# Patient Record
Sex: FEMALE | Race: WHITE | NOT HISPANIC OR LATINO | Employment: OTHER | URBAN - METROPOLITAN AREA
[De-identification: names, ages, dates, MRNs, and addresses within clinical notes are randomized per-mention and may not be internally consistent; named-entity substitution may affect disease eponyms.]

---

## 2017-05-10 ENCOUNTER — OFFICE VISIT (OUTPATIENT)
Dept: SURGERY | Facility: CLINIC | Age: 67
End: 2017-05-10

## 2017-05-10 VITALS
SYSTOLIC BLOOD PRESSURE: 110 MMHG | DIASTOLIC BLOOD PRESSURE: 70 MMHG | HEART RATE: 80 BPM | OXYGEN SATURATION: 96 % | HEIGHT: 70 IN | WEIGHT: 155 LBS | BODY MASS INDEX: 22.19 KG/M2

## 2017-05-10 DIAGNOSIS — K43.2 INCISIONAL HERNIA, WITHOUT OBSTRUCTION OR GANGRENE: Primary | ICD-10-CM

## 2017-05-10 PROCEDURE — 99203 OFFICE O/P NEW LOW 30 MIN: CPT | Performed by: SURGERY

## 2017-05-10 RX ORDER — ALENDRONATE SODIUM 70 MG/1
70 TABLET ORAL
COMMUNITY
End: 2017-08-15 | Stop reason: SDUPTHER

## 2017-05-10 RX ORDER — MOMETASONE FUROATE 50 UG/1
2 SPRAY, METERED NASAL DAILY PRN
COMMUNITY

## 2017-05-10 RX ORDER — ATORVASTATIN CALCIUM 20 MG/1
20 TABLET, FILM COATED ORAL NIGHTLY
COMMUNITY
End: 2019-08-27

## 2017-05-10 RX ORDER — ALBUTEROL SULFATE 90 UG/1
2 AEROSOL, METERED RESPIRATORY (INHALATION) EVERY 4 HOURS PRN
COMMUNITY
End: 2019-08-27

## 2017-05-10 RX ORDER — MONTELUKAST SODIUM 10 MG/1
10 TABLET ORAL NIGHTLY
COMMUNITY

## 2017-05-10 RX ORDER — CETIRIZINE HYDROCHLORIDE 10 MG/1
10 TABLET ORAL DAILY
COMMUNITY

## 2017-06-15 ENCOUNTER — APPOINTMENT (OUTPATIENT)
Dept: PREADMISSION TESTING | Facility: HOSPITAL | Age: 67
End: 2017-06-15

## 2017-06-15 VITALS
RESPIRATION RATE: 18 BRPM | HEART RATE: 67 BPM | HEIGHT: 70 IN | TEMPERATURE: 98.1 F | WEIGHT: 152 LBS | OXYGEN SATURATION: 99 % | SYSTOLIC BLOOD PRESSURE: 130 MMHG | BODY MASS INDEX: 21.76 KG/M2 | DIASTOLIC BLOOD PRESSURE: 94 MMHG

## 2017-06-15 LAB
ANION GAP SERPL CALCULATED.3IONS-SCNC: 12.3 MMOL/L
BUN BLD-MCNC: 8 MG/DL (ref 8–23)
BUN/CREAT SERPL: 14 (ref 7–25)
CALCIUM SPEC-SCNC: 9.7 MG/DL (ref 8.6–10.5)
CHLORIDE SERPL-SCNC: 101 MMOL/L (ref 98–107)
CO2 SERPL-SCNC: 24.7 MMOL/L (ref 22–29)
CREAT BLD-MCNC: 0.57 MG/DL (ref 0.57–1)
DEPRECATED RDW RBC AUTO: 43.7 FL (ref 37–54)
ERYTHROCYTE [DISTWIDTH] IN BLOOD BY AUTOMATED COUNT: 12.3 % (ref 11.7–13)
GFR SERPL CREATININE-BSD FRML MDRD: 106 ML/MIN/1.73
GLUCOSE BLD-MCNC: 106 MG/DL (ref 65–99)
HCT VFR BLD AUTO: 42.6 % (ref 35.6–45.5)
HGB BLD-MCNC: 14.5 G/DL (ref 11.9–15.5)
MCH RBC QN AUTO: 33 PG (ref 26.9–32)
MCHC RBC AUTO-ENTMCNC: 34 G/DL (ref 32.4–36.3)
MCV RBC AUTO: 97 FL (ref 80.5–98.2)
PLATELET # BLD AUTO: 216 10*3/MM3 (ref 140–500)
PMV BLD AUTO: 10.2 FL (ref 6–12)
POTASSIUM BLD-SCNC: 4.4 MMOL/L (ref 3.5–5.2)
RBC # BLD AUTO: 4.39 10*6/MM3 (ref 3.9–5.2)
SODIUM BLD-SCNC: 138 MMOL/L (ref 136–145)
WBC NRBC COR # BLD: 6.34 10*3/MM3 (ref 4.5–10.7)

## 2017-06-15 PROCEDURE — 93005 ELECTROCARDIOGRAM TRACING: CPT

## 2017-06-15 PROCEDURE — 80048 BASIC METABOLIC PNL TOTAL CA: CPT | Performed by: SURGERY

## 2017-06-15 PROCEDURE — 85027 COMPLETE CBC AUTOMATED: CPT | Performed by: SURGERY

## 2017-06-15 PROCEDURE — 36415 COLL VENOUS BLD VENIPUNCTURE: CPT

## 2017-06-15 NOTE — DISCHARGE INSTRUCTIONS
Take the following medications the morning of surgery with a small sip of water:   ADVAIR    ARRIVE AT 6AM        General Instructions:  • Do not eat solid food after midnight the night before surgery.  • You may drink clear liquids day of surgery but must stop at least one hour before your hospital arrival time.  • It is beneficial for you to have a clear drink that contains carbohydrates the day of surgery.  We suggest a 20 ounce bottle of Gatorade or Powerade for non-diabetic patients or a 20 ounce bottle of G2 or Powerade Zero for diabetic patients. (Pediatric patients, are not advised to drink a 20 ounce carbohydrate drink)    Clear liquids are liquids you can see through.  Nothing red in color.     Plain water                               Sports drinks  Sodas                                   Gelatin (Jell-O)  Fruit juices without pulp such as white grape juice and apple juice  Popsicles that contain no fruit or yogurt  Tea or coffee (no cream or milk added)  Gatorade / Powerade  G2 / Powerade Zero    • Infants may have breast milk up to four hours before surgery.  • Infants drinking formula may drink formula up to six hours before surgery.   • Patients who avoid smoking, chewing tobacco and alcohol for 4 weeks prior to surgery have a reduced risk of post-operative complications.  Quit smoking as many days before surgery as you can.  • Do not smoke, use chewing tobacco or drink alcohol the day of surgery.   • If applicable bring your C-PAP/ BI-PAP machine.  • Bring any papers given to you in the doctor’s office.  • Wear clean comfortable clothes and socks.  • Do not wear contact lenses or make-up.  Bring a case for your glasses.   • Bring crutches or walker if applicable.  • Leave all other valuables and jewelry at home.  • The Pre-Admission Testing nurse will instruct you to bring medications if unable to obtain an accurate list in Pre-Admission Testing.        If you were given a blood bank ID arm band  remember to bring it with you the day of surgery.    Preventing a Surgical Site Infection:  • For 2 to 3 days before surgery, avoid shaving with a razor because the razor can irritate skin and make it easier to develop an infection.  • The night prior to surgery sleep in a clean bed with clean clothing.  Do not allow pets to sleep with you.  • Shower on the morning of surgery using a fresh bar of anti-bacterial soap (such as Dial) and clean washcloth.  Dry with a clean towel and dress in clean clothing.  • Ask your surgeon if you will be receiving antibiotics prior to surgery.  • Make sure you, your family, and all healthcare providers clean their hands with soap and water or an alcohol based hand  before caring for you or your wound.    Day of surgery:  Upon arrival, a Pre-op nurse and Anesthesiologist will review your health history, obtain vital signs, and answer questions you may have.  The only belongings needed at this time will be your home medications and if applicable your C-PAP/BI-PAP machine.  If you are staying overnight your family can leave the rest of your belongings in the car and bring them to your room later.  A Pre-op nurse will start an IV and you may receive medication in preparation for surgery, including something to help you relax.  Your family will be able to see you in the Pre-op area.  While you are in surgery your family should notify the waiting room  if they leave the waiting room area and provide a contact phone number.    Please be aware that surgery does come with discomfort.  We want to make every effort to control your discomfort so please discuss any uncontrolled symptoms with your nurse.   Your doctor will most likely have prescribed pain medications.      If you are going home after surgery you will receive individualized written care instructions before being discharged.  A responsible adult must drive you to and from the hospital on the day of your surgery  and stay with you for 24 hours.    If you are staying overnight following surgery, you will be transported to your hospital room following the recovery period.  [facility] has [facility] rooms.    If you have any questions please call Pre-Admission Testing at [phone].  Deductibles and co-payments are collected on the day of service. Please be prepared to pay the required co-pay, deductible or deposit on the day of service as defined by your plan.

## 2017-06-20 ENCOUNTER — HOSPITAL ENCOUNTER (OUTPATIENT)
Facility: HOSPITAL | Age: 67
Setting detail: HOSPITAL OUTPATIENT SURGERY
Discharge: HOME | End: 2017-06-20
Attending: SURGERY | Admitting: SURGERY

## 2017-06-20 ENCOUNTER — ANESTHESIA (OUTPATIENT)
Dept: PERIOP | Facility: HOSPITAL | Age: 67
End: 2017-06-20

## 2017-06-20 ENCOUNTER — ANESTHESIA EVENT (OUTPATIENT)
Dept: PERIOP | Facility: HOSPITAL | Age: 67
End: 2017-06-20

## 2017-06-20 VITALS
HEIGHT: 70 IN | HEART RATE: 78 BPM | WEIGHT: 155.44 LBS | OXYGEN SATURATION: 94 % | TEMPERATURE: 97.9 F | BODY MASS INDEX: 22.25 KG/M2 | SYSTOLIC BLOOD PRESSURE: 126 MMHG | RESPIRATION RATE: 16 BRPM | DIASTOLIC BLOOD PRESSURE: 80 MMHG

## 2017-06-20 DIAGNOSIS — K43.2 INCISIONAL HERNIA, WITHOUT OBSTRUCTION OR GANGRENE: ICD-10-CM

## 2017-06-20 PROCEDURE — 25010000002 FENTANYL CITRATE (PF) 100 MCG/2ML SOLUTION: Performed by: NURSE ANESTHETIST, CERTIFIED REGISTERED

## 2017-06-20 PROCEDURE — 25010000002 ONDANSETRON PER 1 MG: Performed by: NURSE ANESTHETIST, CERTIFIED REGISTERED

## 2017-06-20 PROCEDURE — 25010000002 NEOSTIGMINE PER 0.5 MG: Performed by: NURSE ANESTHETIST, CERTIFIED REGISTERED

## 2017-06-20 PROCEDURE — 25010000002 HYDRALAZINE PER 20 MG: Performed by: NURSE ANESTHETIST, CERTIFIED REGISTERED

## 2017-06-20 PROCEDURE — 49655 PR LAP, INCISIONAL HERNIA REPAIR,INCARCERATED: CPT | Performed by: SURGERY

## 2017-06-20 PROCEDURE — 25010000003 CEFAZOLIN IN DEXTROSE 2-4 GM/100ML-% SOLUTION: Performed by: SURGERY

## 2017-06-20 PROCEDURE — C1781 MESH (IMPLANTABLE): HCPCS | Performed by: SURGERY

## 2017-06-20 PROCEDURE — S0260 H&P FOR SURGERY: HCPCS | Performed by: SURGERY

## 2017-06-20 PROCEDURE — 25010000002 DEXAMETHASONE PER 1 MG: Performed by: NURSE ANESTHETIST, CERTIFIED REGISTERED

## 2017-06-20 PROCEDURE — 25010000002 PROPOFOL 10 MG/ML EMULSION: Performed by: NURSE ANESTHETIST, CERTIFIED REGISTERED

## 2017-06-20 PROCEDURE — 25010000002 HYDROMORPHONE PER 4 MG: Performed by: NURSE ANESTHETIST, CERTIFIED REGISTERED

## 2017-06-20 PROCEDURE — 25010000002 MIDAZOLAM PER 1 MG: Performed by: ANESTHESIOLOGY

## 2017-06-20 PROCEDURE — 63710000001 PROMETHAZINE PER 25 MG: Performed by: NURSE ANESTHETIST, CERTIFIED REGISTERED

## 2017-06-20 DEVICE — VENTRALIGHT ST MESH WITH ECHO PS POSITONING SYSTEM
Type: IMPLANTABLE DEVICE | Status: FUNCTIONAL
Brand: VENTRALIGHT ST MESH WITH ECHO PS POSITONING SYSTEM

## 2017-06-20 RX ORDER — PROPOFOL 10 MG/ML
VIAL (ML) INTRAVENOUS AS NEEDED
Status: DISCONTINUED | OUTPATIENT
Start: 2017-06-20 | End: 2017-06-20 | Stop reason: SURG

## 2017-06-20 RX ORDER — LIDOCAINE HYDROCHLORIDE 20 MG/ML
INJECTION, SOLUTION INFILTRATION; PERINEURAL AS NEEDED
Status: DISCONTINUED | OUTPATIENT
Start: 2017-06-20 | End: 2017-06-20 | Stop reason: SURG

## 2017-06-20 RX ORDER — PROMETHAZINE HYDROCHLORIDE 12.5 MG/1
12.5 TABLET ORAL EVERY 6 HOURS PRN
Qty: 10 TABLET | Refills: 0 | Status: SHIPPED | OUTPATIENT
Start: 2017-06-20 | End: 2017-07-20

## 2017-06-20 RX ORDER — PROMETHAZINE HYDROCHLORIDE 25 MG/1
12.5 TABLET ORAL ONCE AS NEEDED
Status: DISCONTINUED | OUTPATIENT
Start: 2017-06-20 | End: 2017-06-20 | Stop reason: HOSPADM

## 2017-06-20 RX ORDER — SODIUM CHLORIDE 9 MG/ML
INJECTION, SOLUTION INTRAVENOUS AS NEEDED
Status: DISCONTINUED | OUTPATIENT
Start: 2017-06-20 | End: 2017-06-20 | Stop reason: HOSPADM

## 2017-06-20 RX ORDER — DIPHENHYDRAMINE HYDROCHLORIDE 50 MG/ML
12.5 INJECTION INTRAMUSCULAR; INTRAVENOUS
Status: DISCONTINUED | OUTPATIENT
Start: 2017-06-20 | End: 2017-06-20 | Stop reason: HOSPADM

## 2017-06-20 RX ORDER — PROMETHAZINE HYDROCHLORIDE 25 MG/1
25 TABLET ORAL ONCE AS NEEDED
Status: COMPLETED | OUTPATIENT
Start: 2017-06-20 | End: 2017-06-20

## 2017-06-20 RX ORDER — HYDROMORPHONE HYDROCHLORIDE 1 MG/ML
0.5 INJECTION, SOLUTION INTRAMUSCULAR; INTRAVENOUS; SUBCUTANEOUS
Status: DISCONTINUED | OUTPATIENT
Start: 2017-06-20 | End: 2017-06-20 | Stop reason: HOSPADM

## 2017-06-20 RX ORDER — DEXAMETHASONE SODIUM PHOSPHATE 10 MG/ML
INJECTION INTRAMUSCULAR; INTRAVENOUS AS NEEDED
Status: DISCONTINUED | OUTPATIENT
Start: 2017-06-20 | End: 2017-06-20 | Stop reason: SURG

## 2017-06-20 RX ORDER — MIDAZOLAM HYDROCHLORIDE 1 MG/ML
2 INJECTION INTRAMUSCULAR; INTRAVENOUS
Status: DISCONTINUED | OUTPATIENT
Start: 2017-06-20 | End: 2017-06-20 | Stop reason: HOSPADM

## 2017-06-20 RX ORDER — PROMETHAZINE HYDROCHLORIDE 25 MG/ML
12.5 INJECTION, SOLUTION INTRAMUSCULAR; INTRAVENOUS ONCE AS NEEDED
Status: COMPLETED | OUTPATIENT
Start: 2017-06-20 | End: 2017-06-20

## 2017-06-20 RX ORDER — LABETALOL HYDROCHLORIDE 5 MG/ML
5 INJECTION, SOLUTION INTRAVENOUS
Status: DISCONTINUED | OUTPATIENT
Start: 2017-06-20 | End: 2017-06-20 | Stop reason: HOSPADM

## 2017-06-20 RX ORDER — FENTANYL CITRATE 50 UG/ML
INJECTION, SOLUTION INTRAMUSCULAR; INTRAVENOUS AS NEEDED
Status: DISCONTINUED | OUTPATIENT
Start: 2017-06-20 | End: 2017-06-20 | Stop reason: SURG

## 2017-06-20 RX ORDER — HYDROMORPHONE HCL 110MG/55ML
PATIENT CONTROLLED ANALGESIA SYRINGE INTRAVENOUS AS NEEDED
Status: DISCONTINUED | OUTPATIENT
Start: 2017-06-20 | End: 2017-06-20 | Stop reason: SURG

## 2017-06-20 RX ORDER — SODIUM CHLORIDE, SODIUM LACTATE, POTASSIUM CHLORIDE, CALCIUM CHLORIDE 600; 310; 30; 20 MG/100ML; MG/100ML; MG/100ML; MG/100ML
9 INJECTION, SOLUTION INTRAVENOUS CONTINUOUS
Status: DISCONTINUED | OUTPATIENT
Start: 2017-06-20 | End: 2017-06-20 | Stop reason: HOSPADM

## 2017-06-20 RX ORDER — ONDANSETRON 2 MG/ML
4 INJECTION INTRAMUSCULAR; INTRAVENOUS ONCE AS NEEDED
Status: DISCONTINUED | OUTPATIENT
Start: 2017-06-20 | End: 2017-06-20 | Stop reason: HOSPADM

## 2017-06-20 RX ORDER — HYDRALAZINE HYDROCHLORIDE 20 MG/ML
INJECTION INTRAMUSCULAR; INTRAVENOUS AS NEEDED
Status: DISCONTINUED | OUTPATIENT
Start: 2017-06-20 | End: 2017-06-20 | Stop reason: SURG

## 2017-06-20 RX ORDER — GLYCOPYRROLATE 0.2 MG/ML
INJECTION INTRAMUSCULAR; INTRAVENOUS AS NEEDED
Status: DISCONTINUED | OUTPATIENT
Start: 2017-06-20 | End: 2017-06-20 | Stop reason: SURG

## 2017-06-20 RX ORDER — FAMOTIDINE 10 MG/ML
20 INJECTION, SOLUTION INTRAVENOUS ONCE
Status: COMPLETED | OUTPATIENT
Start: 2017-06-20 | End: 2017-06-20

## 2017-06-20 RX ORDER — FLUMAZENIL 0.1 MG/ML
0.2 INJECTION INTRAVENOUS AS NEEDED
Status: DISCONTINUED | OUTPATIENT
Start: 2017-06-20 | End: 2017-06-20 | Stop reason: HOSPADM

## 2017-06-20 RX ORDER — ROCURONIUM BROMIDE 10 MG/ML
INJECTION, SOLUTION INTRAVENOUS AS NEEDED
Status: DISCONTINUED | OUTPATIENT
Start: 2017-06-20 | End: 2017-06-20 | Stop reason: SURG

## 2017-06-20 RX ORDER — ONDANSETRON 2 MG/ML
INJECTION INTRAMUSCULAR; INTRAVENOUS AS NEEDED
Status: DISCONTINUED | OUTPATIENT
Start: 2017-06-20 | End: 2017-06-20 | Stop reason: SURG

## 2017-06-20 RX ORDER — MIDAZOLAM HYDROCHLORIDE 1 MG/ML
1 INJECTION INTRAMUSCULAR; INTRAVENOUS
Status: DISCONTINUED | OUTPATIENT
Start: 2017-06-20 | End: 2017-06-20 | Stop reason: HOSPADM

## 2017-06-20 RX ORDER — PROMETHAZINE HYDROCHLORIDE 25 MG/1
25 SUPPOSITORY RECTAL ONCE AS NEEDED
Status: COMPLETED | OUTPATIENT
Start: 2017-06-20 | End: 2017-06-20

## 2017-06-20 RX ORDER — FENTANYL CITRATE 50 UG/ML
50 INJECTION, SOLUTION INTRAMUSCULAR; INTRAVENOUS
Status: DISCONTINUED | OUTPATIENT
Start: 2017-06-20 | End: 2017-06-20 | Stop reason: HOSPADM

## 2017-06-20 RX ORDER — EPHEDRINE SULFATE 50 MG/ML
5 INJECTION, SOLUTION INTRAVENOUS ONCE AS NEEDED
Status: DISCONTINUED | OUTPATIENT
Start: 2017-06-20 | End: 2017-06-20 | Stop reason: HOSPADM

## 2017-06-20 RX ORDER — SODIUM CHLORIDE 0.9 % (FLUSH) 0.9 %
1-10 SYRINGE (ML) INJECTION AS NEEDED
Status: DISCONTINUED | OUTPATIENT
Start: 2017-06-20 | End: 2017-06-20 | Stop reason: HOSPADM

## 2017-06-20 RX ORDER — OXYCODONE AND ACETAMINOPHEN 7.5; 325 MG/1; MG/1
1 TABLET ORAL ONCE AS NEEDED
Status: COMPLETED | OUTPATIENT
Start: 2017-06-20 | End: 2017-06-20

## 2017-06-20 RX ORDER — BUPIVACAINE HYDROCHLORIDE AND EPINEPHRINE 2.5; 5 MG/ML; UG/ML
INJECTION, SOLUTION INFILTRATION; PERINEURAL AS NEEDED
Status: DISCONTINUED | OUTPATIENT
Start: 2017-06-20 | End: 2017-06-20 | Stop reason: HOSPADM

## 2017-06-20 RX ORDER — NALOXONE HCL 0.4 MG/ML
0.2 VIAL (ML) INJECTION AS NEEDED
Status: DISCONTINUED | OUTPATIENT
Start: 2017-06-20 | End: 2017-06-20 | Stop reason: HOSPADM

## 2017-06-20 RX ORDER — HYDRALAZINE HYDROCHLORIDE 20 MG/ML
5 INJECTION INTRAMUSCULAR; INTRAVENOUS
Status: DISCONTINUED | OUTPATIENT
Start: 2017-06-20 | End: 2017-06-20 | Stop reason: HOSPADM

## 2017-06-20 RX ORDER — CEFAZOLIN SODIUM 2 G/100ML
2 INJECTION, SOLUTION INTRAVENOUS ONCE
Status: COMPLETED | OUTPATIENT
Start: 2017-06-20 | End: 2017-06-20

## 2017-06-20 RX ORDER — HYDROCODONE BITARTRATE AND ACETAMINOPHEN 7.5; 325 MG/1; MG/1
1 TABLET ORAL ONCE AS NEEDED
Status: DISCONTINUED | OUTPATIENT
Start: 2017-06-20 | End: 2017-06-20 | Stop reason: HOSPADM

## 2017-06-20 RX ORDER — OXYCODONE HYDROCHLORIDE AND ACETAMINOPHEN 5; 325 MG/1; MG/1
1 TABLET ORAL EVERY 4 HOURS PRN
Qty: 30 TABLET | Refills: 0 | Status: SHIPPED | OUTPATIENT
Start: 2017-06-20 | End: 2017-06-30

## 2017-06-20 RX ADMIN — SODIUM CHLORIDE, POTASSIUM CHLORIDE, SODIUM LACTATE AND CALCIUM CHLORIDE 9 ML/HR: 600; 310; 30; 20 INJECTION, SOLUTION INTRAVENOUS at 06:24

## 2017-06-20 RX ADMIN — FAMOTIDINE 20 MG: 10 INJECTION, SOLUTION INTRAVENOUS at 07:42

## 2017-06-20 RX ADMIN — ONDANSETRON 4 MG: 2 INJECTION INTRAMUSCULAR; INTRAVENOUS at 10:17

## 2017-06-20 RX ADMIN — FENTANYL CITRATE 50 MCG: 50 INJECTION INTRAMUSCULAR; INTRAVENOUS at 10:50

## 2017-06-20 RX ADMIN — DEXAMETHASONE SODIUM PHOSPHATE 10 MG: 10 INJECTION INTRAMUSCULAR; INTRAVENOUS at 08:40

## 2017-06-20 RX ADMIN — HYDROMORPHONE HYDROCHLORIDE 0.5 MG: 1 INJECTION, SOLUTION INTRAMUSCULAR; INTRAVENOUS; SUBCUTANEOUS at 12:34

## 2017-06-20 RX ADMIN — PROMETHAZINE HYDROCHLORIDE 25 MG: 25 TABLET ORAL at 11:06

## 2017-06-20 RX ADMIN — MIDAZOLAM 1 MG: 1 INJECTION INTRAMUSCULAR; INTRAVENOUS at 07:42

## 2017-06-20 RX ADMIN — OXYCODONE HYDROCHLORIDE AND ACETAMINOPHEN 1 TABLET: 7.5; 325 TABLET ORAL at 11:20

## 2017-06-20 RX ADMIN — FENTANYL CITRATE 50 MCG: 50 INJECTION INTRAMUSCULAR; INTRAVENOUS at 09:03

## 2017-06-20 RX ADMIN — FENTANYL CITRATE 50 MCG: 50 INJECTION INTRAMUSCULAR; INTRAVENOUS at 08:57

## 2017-06-20 RX ADMIN — HYDROMORPHONE HYDROCHLORIDE 0.5 MG: 2 INJECTION, SOLUTION INTRAMUSCULAR; INTRAVENOUS; SUBCUTANEOUS at 10:31

## 2017-06-20 RX ADMIN — HYDROMORPHONE HYDROCHLORIDE 0.5 MG: 2 INJECTION, SOLUTION INTRAMUSCULAR; INTRAVENOUS; SUBCUTANEOUS at 08:57

## 2017-06-20 RX ADMIN — CEFAZOLIN SODIUM 2 G: 2 INJECTION, SOLUTION INTRAVENOUS at 08:40

## 2017-06-20 RX ADMIN — FENTANYL CITRATE 100 MCG: 50 INJECTION INTRAMUSCULAR; INTRAVENOUS at 08:29

## 2017-06-20 RX ADMIN — NEOSTIGMINE METHYLSULFATE 3 MG: 1 INJECTION INTRAMUSCULAR; INTRAVENOUS; SUBCUTANEOUS at 10:19

## 2017-06-20 RX ADMIN — SODIUM CHLORIDE, POTASSIUM CHLORIDE, SODIUM LACTATE AND CALCIUM CHLORIDE: 600; 310; 30; 20 INJECTION, SOLUTION INTRAVENOUS at 09:16

## 2017-06-20 RX ADMIN — LIDOCAINE HYDROCHLORIDE 60 MG: 20 INJECTION, SOLUTION INFILTRATION; PERINEURAL at 08:32

## 2017-06-20 RX ADMIN — PROPOFOL 200 MG: 10 INJECTION, EMULSION INTRAVENOUS at 08:32

## 2017-06-20 RX ADMIN — GLYCOPYRROLATE 0.4 MG: 0.2 INJECTION INTRAMUSCULAR; INTRAVENOUS at 10:19

## 2017-06-20 RX ADMIN — HYDRALAZINE HYDROCHLORIDE 5 MG: 20 INJECTION INTRAMUSCULAR; INTRAVENOUS at 09:03

## 2017-06-20 RX ADMIN — ROCURONIUM BROMIDE 50 MG: 10 INJECTION INTRAVENOUS at 08:32

## 2017-06-20 RX ADMIN — FENTANYL CITRATE 50 MCG: 50 INJECTION INTRAMUSCULAR; INTRAVENOUS at 11:10

## 2017-06-20 NOTE — H&P
H&P      Chief complaint: Abdominal pain      Patient is a 67 y.o. female referred by Brian Damian MD for evaluation of abdominal pain. Patient had undergone a open colon resection 2 years ago for a perforation after a colonoscopy. Patient had been doing well until approximately 3-4 months ago she noticed a bulge off to the left of the incision which intermittently was hard and painful. Patient describes the pain isn't achy cramp that did not radiate. Patient denies fever, chills, nausea or vomiting. Patient had never had this in the past. Patient reports activities make it worse and rest makes it better.           Past Medical History:   Diagnosis Date   • Abdominal hernia     • Asthma     • Thyroid nodule              Past Surgical History:   Procedure Laterality Date   • COLONOSCOPY       • TUBAL ABDOMINAL LIGATION                Family History   Problem Relation Age of Onset   • Breast cancer Mother     • Colon cancer Maternal Grandmother     • Breast cancer Maternal Aunt     • Pancreatic cancer Father     • Lymphoma Sister                Social History    Substance Use Topics    • Smoking status: Never Smoker    • Smokeless tobacco: Never Used    • Alcohol use Yes         Comment: weekly             Current Outpatient Prescriptions:   • albuterol (PROVENTIL HFA;VENTOLIN HFA) 108 (90 BASE) MCG/ACT inhaler, Inhale 2 puffs Every 4 (Four) Hours As Needed for Wheezing., Disp: , Rfl:   • alendronate (FOSAMAX) 70 MG tablet, Take 70 mg by mouth Every 7 (Seven) Days., Disp: , Rfl:   • atorvastatin (LIPITOR) 20 MG tablet, Take 20 mg by mouth Daily., Disp: , Rfl:   • cetirizine (zyrTEC) 10 MG tablet, Take 10 mg by mouth Daily., Disp: , Rfl:   • fluticasone-salmeterol (ADVAIR) 100-50 MCG/DOSE DISKUS, Inhale 2 (Two) Times a Day., Disp: , Rfl:   • mometasone (NASONEX) 50 MCG/ACT nasal spray, 2 sprays into each nostril Daily., Disp: , Rfl:   • montelukast (SINGULAIR) 10 MG tablet, Take 10 mg by mouth Every Night., Disp:  , Rfl:      Review of Systems   Respiratory: Positive for cough and shortness of breath.   Gastrointestinal: Positive for abdominal distention and abdominal pain.   Allergic/Immunologic: Positive for environmental allergies.   All other systems reviewed and are negative.                                      Physical Exam  General/physcological: Alert and oriented x3 in no acute distress  HEENT: Normal cephalic, atraumatic, PERRLA, EOMI, sclera anicteric, moist mucous membranes, neck is supple, no JVD, no carotid bruits, no thyromegaly no adenopathy  Respiratory: CTA and percussion  CVA: RRR, normal S1-S2, no murmurs, no gallops or rubs  GI: Positive BS, soft, nondistended, nontender, no rebound, no guarding, midline incisional hernia reducible, no organomegaly and no masses  Musculoskeletal: Full range of motion, no clubbing, no cyanosis or edema  Neurovascular: Grossly intact      Patient does not use tobacco products currently and I have counseled the patient to not start using tobacco products in the future.     Assessment:  Symptomatic incisional hernia  Plan:  I have recommended that the patient undergo a laparoscopic incisional hernia repair with mesh using the da Sun robot. I have discussed this procedure in detail with the patient. I have discussed the risks, benefits and alternatives. I have discussed the risk of anesthesia, infection, bleeding, bowel injuries and recurrence. Patient understands these risk, benefits and alternatives and wishes to proceed. I have her scheduled at her earliest convenience.     Nayeli Goldstein MD  General, Minimally Invasive and Endoscopic Surgery  [facility]     [address], [address]  [place], [place], [address]  P: [phone]  F: [phone]     Cc: Brian Damian MD

## 2017-06-20 NOTE — DISCHARGE INSTRUCTIONS
You had one Percocet for pain at 11:20 am.    You had one Phenergan for nausea at 11:05 am.          ABDOMINAL HERNIA REPAIR  POST OP RECOMMENDATIONS  Dr. Goldstein  [phone]    ACTIVITIES:  1. Expect to rest the day of surgery, but get up several times daily to reduce the risk of getting a clot in your legs.  2. No strenuous activity or lifting over 10 lbs. for until 6 weeks out from surgery.  Try to avoid squatting and deep bends for about a week.  3. Do not drive while on pain medicine.  You must be off pain meds 24 hours before driving.  4. You can climb stairs, but minimize this and initially do one step at a time (both feet on one step rather than going up with each step.)  5. If an abdominal binder is recommended, wear only when not recumbent.     SYMPTOMS:  1. Skin blistering is not unusual at the incision due to the thinness of the skin from the chronic herniation.  If this is detected at the post-operative appointment it may simply be treated with a topical antibiotic.  2. Constipation is common when taking pain medication for surgery.  Over the counter laxatives, such as Miralax and Milk of Magnesium, can be used temporarily.   3. Fatigue and decreased stamina is not unusual for about a week or so after surgery due to anesthesia.  Try to take walks and some mild activity between resting.  4. If your procedure was performed laparoscopically as opposed to open, shoulder pain is not unusual from the “gas” used in laparoscopy which will dissipate within 1-3 days.  If it does not, call your physician.    WOUND SITE:  1. Dressings can be removed 2 days after procedure.The “tega-derm” may be removed in 2 days if instructed to.  2. Dressings may occasionally have spots of blood on them.  As long as it is dry, these do not need to be changed.  If it is soaked, then the dressing should be removed and a new dressing placed.  3. Skin irritation, redness or itching can prompt removal of the bandage earlier if  present.  4. Redness or warmth that extends outside of the bandage or is spreading should prompt a call to physician.  5. Steri-strips are to be left in place until they fall off on their own in 1-2 weeks.  If they are irritating then they may be removed sooner.  6. Showering may occur while the “tega-derm” is in place if you do not have a drain.  If it is off, then you must wait 2 days after surgery before showering.  7. If you have a drain, no showering until removed, only sponge bathe.  Empty the drain daily and record output.  Keep drain entry site covered.    MEALS:  1. Eat and Drink very lightly when returning home following surgery.  Jell-O, ginger ale, chicken noodle soup and crackers are good examples.  The day after surgery you may broaden your diet.  2. Do NOT take pain pills on an empty stomach.    WORK:  1. In general if you have a sedentary job, you can return to work in 7-10 days at the earliest.  If heavy lifting is required it may be 6 weeks or more.   Any changes to these numbers will be discussed post-operatively.  2. Use discretion and remember that your stamina will be decreased post operatively.  3. Return to work notes can be provided at the time of your post-operative appointment.    FOLLOW UP:  Call and make a post-operative appointment for approximately 2 weeks after the procedure.      Nayeli Goldstein MD  General, Minimally Invasive and Endoscopic Surgery  [facility]    [address], [address]  [place], [place], [address]  P: [phone]  F: [phone]    Cc:  Brian Damian MD

## 2017-06-20 NOTE — OP NOTE
Operative Note:    Pre-op Dx:  Ventral Hernia     Post-op Dx: Incarcerated with small bowel and omentum Ventral Hernia    Procedure:  Laparoscopic Da Sun  Incarcerated Ventral Hernia Repair with Mesh    Surgeon:  Nayeli Goldstein M.D.    Assistant: SAURAV Rivers    Anesthesia:  GET    EBL:  Minium    Specimen:  None    Complications:  None    Findings:  Incarcerated incisional hernia with small bowel and omentum    Indications:  This is a pleasant 67 year old female that presented with a symptomatic Ventral hernia    Procedure:     After general endotracheal anesthesia, patient was prepped and draped in the usual sterile fashion.  Skin incision was performed midway between the left ASIS and the left  subcostal margin.  The Veress needle was inserted and the abdomen was insufflated to 15 mmHg.  The needle was removed and an Optiview 12 mm trocar with a 0° scope was placed under direct visualization.  A robotic 8 mm trocar was placed in the left upper quadrant and left lower quadrant.  Four, 0  V-lock sutures were placed intra-abdominally as well as the Bard echo mesh.  Robotic arms were docked and instruments were placed under direct visualization.  The remainder of the procedure was carried out from the robotic console.  The hernia defect closure was carried out with running  #0 V- lock sutures.  Once the hernia defect was closed, the defect was covered with Bard mesh and secured in a circumferential manner with number 0 V-lock sutures.  All needles and the balloon were removed.  There was no evidence of any bleeding.  All trochars were then removed, infiltrated with quarter percent marcaine with epinephrine and closed with 4-0 Vicryl. Sterile bandages were applied. Patient was transferred to recovery room in stable condition.      Nayeli Goldstein MD  General, Minimally Invasive and Endoscopic Surgery  [facility]    [address] [place] [address], [address]  [place], [place], [address]  P: [phone]  F:  [phone]    Cc:  Brian Damian MD

## 2017-06-20 NOTE — ANESTHESIA PREPROCEDURE EVALUATION
Anesthesia Evaluation     NPO Solid Status: > 8 hours  NPO Liquid Status: > 4 hours     Airway   Mallampati: II  no difficulty expected  Dental      Comment: crowns    Pulmonary     breath sounds clear to auscultation  (+) asthma,   Cardiovascular     Rhythm: regular        Neuro/Psych  GI/Hepatic/Renal/Endo      Musculoskeletal     Abdominal    Substance History      OB/GYN          Other                                        Anesthesia Plan    ASA 2     general     intravenous induction   Anesthetic plan and risks discussed with patient.

## 2017-06-20 NOTE — PLAN OF CARE
Problem: Perioperative Period (Adult)  Goal: Signs and Symptoms of Listed Potential Problems Will be Absent or Manageable (Perioperative Period)  Outcome: Outcome(s) achieved Date Met:  06/20/17 06/20/17 1310   Perioperative Period   Problems Assessed (Perioperative Period) all   Problems Present (Perioperative Period) none

## 2017-06-20 NOTE — ANESTHESIA PROCEDURE NOTES
Airway  Urgency: elective    Date/Time: 6/20/2017 8:38 AM  Airway not difficult    General Information and Staff    Patient location during procedure: OR  Anesthesiologist: CAMERON POPE  CRNA: GEO WASHINGTON    Indications and Patient Condition  Indications for airway management: airway protection    Preoxygenated: yes  Mask difficulty assessment: 2 - vent by mask + OA or adjuvant +/- NMBA    Final Airway Details  Final airway type: endotracheal airway      Successful airway: ETT  Cuffed: yes   Successful intubation technique: direct laryngoscopy  Facilitating devices/methods: intubating stylet  Endotracheal tube insertion site: oral  Blade: Starr  Blade size: #2  ETT size: 7.0 mm  Cormack-Lehane Classification: grade I - full view of glottis  Placement verified by: chest auscultation and capnometry   Measured from: teeth  ETT to teeth (cm): 19  Number of attempts at approach: 1    Additional Comments  Atraumatic. No dental damage noted.

## 2017-06-20 NOTE — PLAN OF CARE
"Problem: Patient Care Overview (Adult)  Goal: Plan of Care Review  Outcome: Ongoing (interventions implemented as appropriate)    06/20/17 0617   Coping/Psychosocial Response Interventions   Plan Of Care Reviewed With patient   Patient Care Overview   Progress no change       Goal: Adult Individualization and Mutuality  Outcome: Ongoing (interventions implemented as appropriate)    06/20/17 0617   Individualization   Patient Specific Goals \"FIX THE HERNIA\"       Goal: Discharge Needs Assessment  Outcome: Ongoing (interventions implemented as appropriate)    Problem: Perioperative Period (Adult)  Goal: Signs and Symptoms of Listed Potential Problems Will be Absent or Manageable (Perioperative Period)  Outcome: Ongoing (interventions implemented as appropriate)    06/20/17 0617   Perioperative Period   Problems Assessed (Perioperative Period) pain;hypoxia/hypoxemia   Problems Present (Perioperative Period) none           "

## 2017-06-20 NOTE — ANESTHESIA POSTPROCEDURE EVALUATION
Patient: April Feliz    Procedure Summary     Date Anesthesia Start Anesthesia Stop Room / Location    06/20/17 0827 1039  [facility] /  [facility]       Procedure Diagnosis Surgeon Provider    VENTRAL HERNIA REPAIR LAPAROSCOPIC WITH DAVINCI ROBOT (N/A Abdomen) Incisional hernia, without obstruction or gangrene  (Incisional hernia, without obstruction or gangrene [K43.2]) MD Akira Mendoza MD          Anesthesia Type: general  Last vitals  /95 (06/20/17 1130)    Temp 36.6 °C (97.9 °F) (06/20/17 1130)    Pulse 72 (06/20/17 1130)   Resp 16 (06/20/17 1130)    SpO2 95 % (06/20/17 1115)      Post Anesthesia Care and Evaluation    Patient location during evaluation: PACU  Patient participation: complete - patient participated  Level of consciousness: sleepy but conscious  Pain score: 0  Pain management: adequate  Airway patency: patent  Anesthetic complications: No anesthetic complications    Cardiovascular status: acceptable  Respiratory status: acceptable  Hydration status: acceptable

## 2017-06-29 ENCOUNTER — OFFICE VISIT (OUTPATIENT)
Dept: SURGERY | Facility: CLINIC | Age: 67
End: 2017-06-29

## 2017-06-29 VITALS
HEART RATE: 81 BPM | DIASTOLIC BLOOD PRESSURE: 78 MMHG | SYSTOLIC BLOOD PRESSURE: 120 MMHG | OXYGEN SATURATION: 98 % | WEIGHT: 153.6 LBS | HEIGHT: 70 IN | BODY MASS INDEX: 21.99 KG/M2

## 2017-06-29 DIAGNOSIS — Z09 FOLLOW UP: Primary | ICD-10-CM

## 2017-06-29 PROCEDURE — 99024 POSTOP FOLLOW-UP VISIT: CPT | Performed by: SURGERY

## 2017-06-29 NOTE — PROGRESS NOTES
Chief complaint:  Post-op  Follow up    History of Present Illness    This is April Whitfieldor 67 y.o. status post laparoscopic incisional hernia repair with mesh and is doing very well.  Patient denies fever, chills, nausea or vomiting.  Patient's pain is well-controlled.      The following portions of the patient's history were reviewed and updated as appropriate: allergies, current medications, past family history, past medical history, past social history, past surgical history and problem list.    Physical Exam  Incision is well-healed without evidence of infection or herniation.    Patient does not use tobacco products currently and I have counseled the patient not to start using tobacco products in the future.    Assessment/plan:    This is April Whitfieldor 67 y.o. status post laparoscopic incisional hernia repair with mesh and is doing very well.  I have instructed the patient not lift greater than 10 pounds for total of 6 weeks from the time of surgery. I have instructed the patient follow-up as needed.    Nayeli Goldstein MD  General, Minimally Invasive and Endoscopic Surgery  [facility]    [address], [address]  [place], [place], [address]  P: [phone]  F: [phone]    Cc:  Brian Damian MD

## 2017-08-15 ENCOUNTER — OFFICE VISIT (OUTPATIENT)
Dept: OBSTETRICS AND GYNECOLOGY | Facility: CLINIC | Age: 67
End: 2017-08-15

## 2017-08-15 VITALS
DIASTOLIC BLOOD PRESSURE: 98 MMHG | BODY MASS INDEX: 22.33 KG/M2 | HEIGHT: 70 IN | HEART RATE: 73 BPM | SYSTOLIC BLOOD PRESSURE: 151 MMHG | WEIGHT: 156 LBS

## 2017-08-15 DIAGNOSIS — M85.80 OSTEOPENIA: ICD-10-CM

## 2017-08-15 DIAGNOSIS — Z01.419 ENCOUNTER FOR GYNECOLOGICAL EXAMINATION WITHOUT ABNORMAL FINDING: Primary | ICD-10-CM

## 2017-08-15 PROCEDURE — 99397 PER PM REEVAL EST PAT 65+ YR: CPT | Performed by: OBSTETRICS & GYNECOLOGY

## 2017-08-15 RX ORDER — ASPIRIN 81 MG/1
81 TABLET ORAL DAILY
COMMUNITY
End: 2018-08-21 | Stop reason: SDUPTHER

## 2017-08-15 RX ORDER — ALENDRONATE SODIUM 70 MG/1
70 TABLET ORAL
Qty: 12 TABLET | Refills: 3 | Status: SHIPPED | OUTPATIENT
Start: 2017-08-15 | End: 2018-08-21 | Stop reason: SDUPTHER

## 2017-08-15 NOTE — PROGRESS NOTES
GYN Annual Exam     CC- Here for annual exam.     April Feliz is a 67 y.o. female who presents for annual well woman exam. Periods are absent due to Menopause.      OB History      Para Term  AB TAB SAB Ectopic Multiple Living    2 2 2       2          Current contraception: post menopausal status  History of abnormal Pap smear: yes - Cryo in the past  Family history of uterine, colon or ovarian cancer: yes - colon cancer   History of abnormal mammogram: yes - getting spot compression and ultrasound this week   Family history of breast cancer: yes - Mother, maternal grandmother   Last Pap : 2016  Last Mammo: 2017  Last Dexa: 2017  Last Colonoscopy:      Past Medical History:   Diagnosis Date   • Abdominal hernia    • Asthma    • Hyperlipidemia    • Thyroid nodule        Past Surgical History:   Procedure Laterality Date   • COLON RESECTION     • COLONOSCOPY     • TUBAL ABDOMINAL LIGATION     • VENTRAL HERNIA REPAIR N/A 2017    Procedure: VENTRAL HERNIA REPAIR LAPAROSCOPIC WITH DAVINCI ROBOT;  Surgeon: Nayeli Goldstein MD;  Location: [facility]  Service:          Current Outpatient Prescriptions:   •  aspirin 81 MG EC tablet, Take 81 mg by mouth Daily., Disp: , Rfl:   •  albuterol (PROVENTIL HFA;VENTOLIN HFA) 108 (90 BASE) MCG/ACT inhaler, Inhale 2 puffs Every 4 (Four) Hours As Needed for Wheezing., Disp: , Rfl:   •  alendronate (FOSAMAX) 70 MG tablet, Take 70 mg by mouth Every 7 (Seven) Days. , Disp: , Rfl:   •  atorvastatin (LIPITOR) 20 MG tablet, Take 20 mg by mouth Every Night., Disp: , Rfl:   •  Calcium Citrate-Vitamin D (CALCIUM + D PO), Take 1 tablet by mouth Daily., Disp: , Rfl:   •  cetirizine (zyrTEC) 10 MG tablet, Take 10 mg by mouth Daily., Disp: , Rfl:   •  fluticasone-salmeterol (ADVAIR) 100-50 MCG/DOSE DISKUS, Inhale 1 puff 2 (Two) Times a Day., Disp: , Rfl:   •  mometasone (NASONEX) 50 MCG/ACT nasal spray, 2 sprays into each nostril As  "Needed., Disp: , Rfl:   •  montelukast (SINGULAIR) 10 MG tablet, Take 10 mg by mouth Every Night., Disp: , Rfl:   •  Multiple Vitamins-Minerals (MULTIVITAMIN ADULT PO), Take 1 tablet by mouth Daily., Disp: , Rfl:     Allergies   Allergen Reactions   • Penicillins Rash       Social History   Substance Use Topics   • Smoking status: Former Smoker     Packs/day: 0.50     Years: 10.00     Types: Cigarettes   • Smokeless tobacco: Never Used      Comment: 33YRS AGO   • Alcohol use Yes      Comment: weekly       Family History   Problem Relation Age of Onset   • Breast cancer Mother    • Colon cancer Maternal Grandmother    • Breast cancer Maternal Aunt    • Pancreatic cancer Father    • Lymphoma Sister    • Malig Hyperthermia Neg Hx    • Ovarian cancer Neg Hx    • Uterine cancer Neg Hx    • Deep vein thrombosis Neg Hx    • Pulmonary embolism Neg Hx        Review of Systems   Constitutional: Negative for chills and fever.   Gastrointestinal: Negative for abdominal pain.   Genitourinary: Negative for dysuria, pelvic pain, vaginal bleeding and vaginal discharge.   All other systems reviewed and are negative.      /98  Pulse 73  Ht 70\" (177.8 cm)  Wt 156 lb (70.8 kg)  Breastfeeding? No  BMI 22.38 kg/m2    Physical Exam   Constitutional: She is oriented to person, place, and time. She appears well-developed and well-nourished. No distress.   HENT:   Head: Normocephalic and atraumatic.   Eyes: Conjunctivae are normal.   Neck: Normal range of motion. Neck supple. No thyromegaly present.   Cardiovascular: Normal rate and regular rhythm.    No murmur heard.  Pulmonary/Chest: Effort normal and breath sounds normal. Right breast exhibits no inverted nipple, no mass and no nipple discharge. Left breast exhibits no inverted nipple, no mass and no nipple discharge.   Abdominal: Soft. Bowel sounds are normal. She exhibits no distension. There is no tenderness.   Genitourinary: Vagina normal and uterus normal. Pelvic exam was " performed with patient supine. There is no lesion on the right labia. There is no lesion on the left labia. Uterus is not enlarged, not fixed and not tender. Cervix exhibits no motion tenderness. Right adnexum displays no mass and no tenderness. Left adnexum displays no mass and no tenderness. No bleeding (atrophic ) in the vagina. No vaginal discharge found.   Musculoskeletal: She exhibits no edema.   Lymphadenopathy:        Right: No inguinal adenopathy present.        Left: No inguinal adenopathy present.   Neurological: She is alert and oriented to person, place, and time.   Skin: No rash noted.   Psychiatric: She has a normal mood and affect. Her behavior is normal.          Assessment     1) GYN annual well woman exam.   2) osteopenia   Awaiting Dexa  Continue fosamax      Plan     1) Breast Health - Clinical breast exam & mammogram yearly, Self breast awareness monthly  2) Pap - up to date  3) Smoking status- non-smoker   4) Colon health - screening colonoscopy recommended if not up to date  5) Bone health - Weight bearing exercise, dietary calcium recommendations and vitamin D reviewed.   6) Seat belts recommended  7) Follow up prn and one year      Jerel Lincoln MD   8/15/2017  9:21 AM

## 2017-10-27 ENCOUNTER — TRANSCRIBE ORDERS (OUTPATIENT)
Dept: ADMINISTRATIVE | Facility: HOSPITAL | Age: 67
End: 2017-10-27

## 2017-10-27 DIAGNOSIS — J98.4 RESTRICTIVE LUNG DISEASE: Primary | ICD-10-CM

## 2017-11-02 ENCOUNTER — ANESTHESIA (OUTPATIENT)
Dept: GASTROENTEROLOGY | Facility: HOSPITAL | Age: 67
End: 2017-11-02

## 2017-11-02 ENCOUNTER — ANESTHESIA EVENT (OUTPATIENT)
Dept: GASTROENTEROLOGY | Facility: HOSPITAL | Age: 67
End: 2017-11-02

## 2017-11-02 ENCOUNTER — HOSPITAL ENCOUNTER (OUTPATIENT)
Facility: HOSPITAL | Age: 67
Setting detail: HOSPITAL OUTPATIENT SURGERY
Discharge: HOME | End: 2017-11-02
Attending: INTERNAL MEDICINE | Admitting: INTERNAL MEDICINE

## 2017-11-02 VITALS
RESPIRATION RATE: 20 BRPM | OXYGEN SATURATION: 96 % | TEMPERATURE: 98.9 F | DIASTOLIC BLOOD PRESSURE: 91 MMHG | WEIGHT: 156.31 LBS | BODY MASS INDEX: 22.38 KG/M2 | HEIGHT: 70 IN | HEART RATE: 68 BPM | SYSTOLIC BLOOD PRESSURE: 134 MMHG

## 2017-11-02 DIAGNOSIS — R05.9 COUGH: ICD-10-CM

## 2017-11-02 LAB
APPEARANCE FLD: CLEAR
B PERT DNA SPEC QL NAA+PROBE: NOT DETECTED
C PNEUM DNA NPH QL NAA+NON-PROBE: NOT DETECTED
COLOR FLD: COLORLESS
FLUAV H1 2009 PAND RNA NPH QL NAA+PROBE: NOT DETECTED
FLUAV H1 HA GENE NPH QL NAA+PROBE: NOT DETECTED
FLUAV H3 RNA NPH QL NAA+PROBE: NOT DETECTED
FLUAV SUBTYP SPEC NAA+PROBE: NOT DETECTED
FLUBV RNA ISLT QL NAA+PROBE: NOT DETECTED
GIE STN SPEC: NORMAL
GIE STN SPEC: NORMAL
HADV DNA SPEC NAA+PROBE: NOT DETECTED
HCOV 229E RNA SPEC QL NAA+PROBE: NOT DETECTED
HCOV HKU1 RNA SPEC QL NAA+PROBE: NOT DETECTED
HCOV NL63 RNA SPEC QL NAA+PROBE: NOT DETECTED
HCOV OC43 RNA SPEC QL NAA+PROBE: NOT DETECTED
HMPV RNA NPH QL NAA+NON-PROBE: NOT DETECTED
HPIV1 RNA SPEC QL NAA+PROBE: NOT DETECTED
HPIV2 RNA SPEC QL NAA+PROBE: NOT DETECTED
HPIV3 RNA NPH QL NAA+PROBE: NOT DETECTED
HPIV4 P GENE NPH QL NAA+PROBE: NOT DETECTED
LYMPHOCYTES NFR FLD MANUAL: 37 %
M PNEUMO IGG SER IA-ACNC: NOT DETECTED
METHOD: NORMAL
MONOCYTES NFR FLD: 1 %
MONOS+MACROS NFR FLD: 29 %
NEUTROPHILS NFR FLD MANUAL: 33 %
NUC CELL # FLD: 23 /MM3
OTHER CELLS FLUID PER 100/WBCS: 27 /100 WBCS
RBC # FLD AUTO: 10 /MM3
RHINOVIRUS RNA SPEC NAA+PROBE: NOT DETECTED
RSV RNA NPH QL NAA+NON-PROBE: NOT DETECTED

## 2017-11-02 PROCEDURE — 89051 BODY FLUID CELL COUNT: CPT | Performed by: INTERNAL MEDICINE

## 2017-11-02 PROCEDURE — 87486 CHLMYD PNEUM DNA AMP PROBE: CPT | Performed by: INTERNAL MEDICINE

## 2017-11-02 PROCEDURE — 87798 DETECT AGENT NOS DNA AMP: CPT | Performed by: INTERNAL MEDICINE

## 2017-11-02 PROCEDURE — 87206 SMEAR FLUORESCENT/ACID STAI: CPT | Performed by: INTERNAL MEDICINE

## 2017-11-02 PROCEDURE — 87633 RESP VIRUS 12-25 TARGETS: CPT | Performed by: INTERNAL MEDICINE

## 2017-11-02 PROCEDURE — 87071 CULTURE AEROBIC QUANT OTHER: CPT | Performed by: INTERNAL MEDICINE

## 2017-11-02 PROCEDURE — 87581 M.PNEUMON DNA AMP PROBE: CPT | Performed by: INTERNAL MEDICINE

## 2017-11-02 PROCEDURE — 88112 CYTOPATH CELL ENHANCE TECH: CPT | Performed by: INTERNAL MEDICINE

## 2017-11-02 PROCEDURE — 87070 CULTURE OTHR SPECIMN AEROBIC: CPT | Performed by: INTERNAL MEDICINE

## 2017-11-02 PROCEDURE — 87116 MYCOBACTERIA CULTURE: CPT | Performed by: INTERNAL MEDICINE

## 2017-11-02 PROCEDURE — 88312 SPECIAL STAINS GROUP 1: CPT | Performed by: INTERNAL MEDICINE

## 2017-11-02 PROCEDURE — 87102 FUNGUS ISOLATION CULTURE: CPT | Performed by: INTERNAL MEDICINE

## 2017-11-02 PROCEDURE — 87205 SMEAR GRAM STAIN: CPT | Performed by: INTERNAL MEDICINE

## 2017-11-02 PROCEDURE — 25010000002 PROPOFOL 10 MG/ML EMULSION: Performed by: ANESTHESIOLOGY

## 2017-11-02 RX ORDER — LIDOCAINE HYDROCHLORIDE 20 MG/ML
INJECTION, SOLUTION INFILTRATION; PERINEURAL AS NEEDED
Status: DISCONTINUED | OUTPATIENT
Start: 2017-11-02 | End: 2017-11-02 | Stop reason: SURG

## 2017-11-02 RX ORDER — LIDOCAINE HYDROCHLORIDE 10 MG/ML
INJECTION, SOLUTION EPIDURAL; INFILTRATION; INTRACAUDAL; PERINEURAL AS NEEDED
Status: DISCONTINUED | OUTPATIENT
Start: 2017-11-02 | End: 2017-11-02 | Stop reason: HOSPADM

## 2017-11-02 RX ORDER — PROPOFOL 10 MG/ML
VIAL (ML) INTRAVENOUS CONTINUOUS PRN
Status: DISCONTINUED | OUTPATIENT
Start: 2017-11-02 | End: 2017-11-02 | Stop reason: SURG

## 2017-11-02 RX ORDER — PROPOFOL 10 MG/ML
VIAL (ML) INTRAVENOUS AS NEEDED
Status: DISCONTINUED | OUTPATIENT
Start: 2017-11-02 | End: 2017-11-02 | Stop reason: SURG

## 2017-11-02 RX ORDER — SODIUM CHLORIDE 0.9 % (FLUSH) 0.9 %
1-10 SYRINGE (ML) INJECTION AS NEEDED
Status: DISCONTINUED | OUTPATIENT
Start: 2017-11-02 | End: 2017-11-02 | Stop reason: HOSPADM

## 2017-11-02 RX ORDER — SODIUM CHLORIDE, SODIUM LACTATE, POTASSIUM CHLORIDE, CALCIUM CHLORIDE 600; 310; 30; 20 MG/100ML; MG/100ML; MG/100ML; MG/100ML
30 INJECTION, SOLUTION INTRAVENOUS CONTINUOUS
Status: DISCONTINUED | OUTPATIENT
Start: 2017-11-02 | End: 2017-11-02 | Stop reason: HOSPADM

## 2017-11-02 RX ADMIN — PROPOFOL 100 MCG/KG/MIN: 10 INJECTION, EMULSION INTRAVENOUS at 08:15

## 2017-11-02 RX ADMIN — PROPOFOL 50 MG: 10 INJECTION, EMULSION INTRAVENOUS at 08:25

## 2017-11-02 RX ADMIN — LIDOCAINE HYDROCHLORIDE 60 MG: 20 INJECTION, SOLUTION INFILTRATION; PERINEURAL at 08:15

## 2017-11-02 RX ADMIN — PROPOFOL 100 MG: 10 INJECTION, EMULSION INTRAVENOUS at 08:20

## 2017-11-02 RX ADMIN — SODIUM CHLORIDE, POTASSIUM CHLORIDE, SODIUM LACTATE AND CALCIUM CHLORIDE 30 ML/HR: 600; 310; 30; 20 INJECTION, SOLUTION INTRAVENOUS at 08:17

## 2017-11-02 RX ADMIN — SODIUM CHLORIDE, POTASSIUM CHLORIDE, SODIUM LACTATE AND CALCIUM CHLORIDE: 600; 310; 30; 20 INJECTION, SOLUTION INTRAVENOUS at 08:05

## 2017-11-02 RX ADMIN — PROPOFOL 200 MG: 10 INJECTION, EMULSION INTRAVENOUS at 08:15

## 2017-11-02 NOTE — PLAN OF CARE
Problem: Patient Care Overview (Adult)  Goal: Plan of Care Review  Outcome: Ongoing (interventions implemented as appropriate)    11/02/17 0740   Coping/Psychosocial Response Interventions   Plan Of Care Reviewed With patient   Patient Care Overview   Progress progress toward functional goals as expected       Goal: Adult Individualization and Mutuality  Outcome: Ongoing (interventions implemented as appropriate)    11/02/17 0740   Individualization   Patient Specific Preferences none         Problem: Bronchoscopy (Adult)  Goal: Signs and Symptoms of Listed Potential Problems Will be Absent or Manageable (Bronchoscopy)  Outcome: Ongoing (interventions implemented as appropriate)    11/02/17 0740   Bronchoscopy   Problems Assessed (Bronchoscopy) pain;bleeding   Problems Present (Bronchoscopy) none

## 2017-11-02 NOTE — ANESTHESIA POSTPROCEDURE EVALUATION
"Patient: Aprli Feliz    Procedure Summary     Date Anesthesia Start Anesthesia Stop Room / Location    11/02/17 0810 0847  [facility]       Procedure Diagnosis Surgeon Provider    BRONCHOSCOPY WITH BAL and brushings (N/A Bronchus) No diagnosis on file. MD Byron Goldman MD          Anesthesia Type: MAC  Last vitals  BP   134/91 (11/02/17 0909)   Temp   37.2 °C (98.9 °F) (11/02/17 0909)   Pulse   68 (11/02/17 0909)   Resp   20 (11/02/17 0909)     SpO2   96 % (11/02/17 0909)     Post Anesthesia Care and Evaluation    Patient location during evaluation: PACU  Patient participation: complete - patient participated  Level of consciousness: awake  Pain score: 0  Pain management: adequate  Airway patency: patent  Anesthetic complications: No anesthetic complications    Cardiovascular status: acceptable  Respiratory status: acceptable  Hydration status: acceptable    Comments: Blood pressure 134/91, pulse 68, temperature 37.2 °C (98.9 °F), temperature source Oral, resp. rate 20, height 69.5\" (176.5 cm), weight 156 lb 5 oz (70.9 kg), SpO2 96 %, not currently breastfeeding.    No anesthesia care post op    "

## 2017-11-02 NOTE — OP NOTE
Bronchoscopy Procedure Note    Procedure:  1. Bronchoscopy, Diagnostic    Pre-Operative Diagnosis:  Chronic cough    Post-Operative Diagnosis: Same plus LB-1 segment orifice of left upper lobe is significantly narrowed with collapse and benign appearing polypoid anomaly.      Indication:  Chronic cough    Anesthesia: Monitored Anesthesia Care (MAC)    Procedure Details: Patient was consented for the procedure with all risk and benefit of the procedure explained in detail.  Patient was given the opportunity to ask questions and all concerns were answered.  The bronchocope was inserted into the main airway via the LMA. An anatomical survey was done of the main airways and the subsegmental bronchus to at least the first subsegmental level of all five lobes of both lungs.  The findings are reported below.  A bronchialalveolar lavage was performed using aliquots of normal saline instilled into the airways then aspirated back.    Findings:  Bronchoscope passed through LMA to the level of the vocal cords.  Lidocaine used for local anesthetic over vocal cords.  Bronchoscope was passed between the vocal cords into the trachea.  All airways were visualized to at least the first subsegment level of all 5 lobes of both lungs.  LB-1 segment orifice of left upper lobe is significantly narrowed with collapse and benign appearing polypoid anomaly.   Able to easily open the orifice with minimal pressure with saline infusion into LB-1 segment but easily collapses after.   Mild secretions noted throughout.  Bronchial alveolar lavage performed in the right middle lobe and left upper lobe with 180cc saline instilled and 65cc hazy return.  Transbronchial brushings performed in the left upper lobe in LB-1 segment x 3 passes.  Mild bleeding noted after brushing which was easily controlled with simple suctioning and stopped by the time procedure was completed.    Estimated Blood Loss:  Minimal           Specimens:  BAL RML and OSORIO,  Transbronchial brushings OSORIO                Complications:  None; patient tolerated the procedure well.           Disposition: PACU - hemodynamically stable.      Patient tolerated the procedure well.    David Castellanos MD  11/2/2017  8:36 AM

## 2017-11-02 NOTE — ANESTHESIA PREPROCEDURE EVALUATION
Anesthesia Evaluation     Patient summary reviewed and Nursing notes reviewed   NPO Solid Status: > 8 hours  NPO Liquid Status: > 8 hours     Airway   Mallampati: I  TM distance: >3 FB  Neck ROM: full  no difficulty expected  Dental - normal exam     Pulmonary - normal exam    breath sounds clear to auscultation  (+) a smoker Former, asthma,   Cardiovascular - normal exam  Exercise tolerance: good (4-7 METS)    Rhythm: regular  Rate: normal    (+) hyperlipidemia      Neuro/Psych- negative ROS  GI/Hepatic/Renal/Endo - negative ROS     Musculoskeletal (-) negative ROS    Abdominal  - normal exam   Substance History - negative use     OB/GYN negative ob/gyn ROS         Other                                      Anesthesia Plan    ASA 2     MAC     Anesthetic plan and risks discussed with patient.

## 2017-11-03 LAB
CYTO UR: NORMAL
LAB AP CASE REPORT: NORMAL
LAB AP CLINICAL INFORMATION: NORMAL
Lab: NORMAL
PATH REPORT.FINAL DX SPEC: NORMAL
PATH REPORT.GROSS SPEC: NORMAL

## 2017-11-04 LAB
BACTERIA SPEC AEROBE CULT: NO GROWTH
BACTERIA SPEC RESP CULT: NO GROWTH
GRAM STN SPEC: NORMAL

## 2017-11-06 ENCOUNTER — HOSPITAL ENCOUNTER (OUTPATIENT)
Dept: CT IMAGING | Facility: HOSPITAL | Age: 67
Discharge: HOME | End: 2017-11-06
Attending: INTERNAL MEDICINE | Admitting: INTERNAL MEDICINE

## 2017-11-06 DIAGNOSIS — J98.4 RESTRICTIVE LUNG DISEASE: ICD-10-CM

## 2017-11-06 PROCEDURE — 71250 CT THORAX DX C-: CPT

## 2017-11-14 ENCOUNTER — TRANSCRIBE ORDERS (OUTPATIENT)
Dept: ADMINISTRATIVE | Facility: HOSPITAL | Age: 67
End: 2017-11-14

## 2017-11-14 DIAGNOSIS — R91.1 PULMONARY NODULE: Primary | ICD-10-CM

## 2017-11-14 DIAGNOSIS — J84.9 ILD (INTERSTITIAL LUNG DISEASE) (HCC): ICD-10-CM

## 2017-11-30 LAB
FUNGUS WND CULT: NORMAL
FUNGUS WND CULT: NORMAL

## 2017-12-14 LAB
MYCOBACTERIUM SPEC CULT: NORMAL
MYCOBACTERIUM SPEC CULT: NORMAL
NIGHT BLUE STAIN TISS: NORMAL
NIGHT BLUE STAIN TISS: NORMAL

## 2018-01-14 ENCOUNTER — APPOINTMENT (OUTPATIENT)
Dept: GENERAL RADIOLOGY | Facility: HOSPITAL | Age: 68
End: 2018-01-14

## 2018-01-14 ENCOUNTER — HOSPITAL ENCOUNTER (EMERGENCY)
Facility: HOSPITAL | Age: 68
Discharge: HOME | End: 2018-01-14
Attending: EMERGENCY MEDICINE | Admitting: EMERGENCY MEDICINE

## 2018-01-14 VITALS
HEART RATE: 78 BPM | RESPIRATION RATE: 18 BRPM | HEIGHT: 69 IN | BODY MASS INDEX: 22.22 KG/M2 | SYSTOLIC BLOOD PRESSURE: 146 MMHG | TEMPERATURE: 98.6 F | DIASTOLIC BLOOD PRESSURE: 92 MMHG | WEIGHT: 150 LBS | OXYGEN SATURATION: 94 %
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
## 2018-01-14 DIAGNOSIS — S52.502A CLOSED FRACTURE OF DISTAL END OF LEFT RADIUS, UNSPECIFIED FRACTURE MORPHOLOGY, INITIAL ENCOUNTER: Primary | ICD-10-CM

## 2018-01-14 PROCEDURE — 73130 X-RAY EXAM OF HAND: CPT

## 2018-01-14 PROCEDURE — 99285 EMERGENCY DEPT VISIT HI MDM: CPT

## 2018-01-14 PROCEDURE — 73110 X-RAY EXAM OF WRIST: CPT

## 2018-01-14 PROCEDURE — 25010000002 PROPOFOL 10 MG/ML EMULSION: Performed by: EMERGENCY MEDICINE

## 2018-01-14 PROCEDURE — 73100 X-RAY EXAM OF WRIST: CPT

## 2018-01-14 RX ORDER — OMEPRAZOLE 20 MG/1
20 CAPSULE, DELAYED RELEASE ORAL DAILY
COMMUNITY
End: 2019-08-27

## 2018-01-14 RX ORDER — PROPOFOL 10 MG/ML
100 VIAL (ML) INTRAVENOUS ONCE
Status: DISCONTINUED | OUTPATIENT
Start: 2018-01-14 | End: 2018-01-14

## 2018-01-14 RX ORDER — HYDROCODONE BITARTRATE AND ACETAMINOPHEN 7.5; 325 MG/1; MG/1
1 TABLET ORAL ONCE
Status: COMPLETED | OUTPATIENT
Start: 2018-01-14 | End: 2018-01-14

## 2018-01-14 RX ORDER — OXYCODONE HYDROCHLORIDE AND ACETAMINOPHEN 5; 325 MG/1; MG/1
1 TABLET ORAL EVERY 4 HOURS PRN
Qty: 12 TABLET | Refills: 0 | Status: SHIPPED | OUTPATIENT
Start: 2018-01-14 | End: 2018-08-21 | Stop reason: SDUPTHER

## 2018-01-14 RX ORDER — PROPOFOL 10 MG/ML
40 VIAL (ML) INTRAVENOUS ONCE
Status: COMPLETED | OUTPATIENT
Start: 2018-01-14 | End: 2018-01-14

## 2018-01-14 RX ORDER — SODIUM CHLORIDE 0.9 % (FLUSH) 0.9 %
10 SYRINGE (ML) INJECTION AS NEEDED
Status: DISCONTINUED | OUTPATIENT
Start: 2018-01-14 | End: 2018-01-14 | Stop reason: HOSPADM

## 2018-01-14 RX ORDER — PROPOFOL 10 MG/ML
20 VIAL (ML) INTRAVENOUS ONCE
Status: COMPLETED | OUTPATIENT
Start: 2018-01-14 | End: 2018-01-14

## 2018-01-14 RX ADMIN — PROPOFOL 40 MG: 10 INJECTION, EMULSION INTRAVENOUS at 14:34

## 2018-01-14 RX ADMIN — HYDROCODONE BITARTRATE AND ACETAMINOPHEN 1 TABLET: 7.5; 325 TABLET ORAL at 11:00

## 2018-01-14 RX ADMIN — PROPOFOL 20 MG: 10 INJECTION, EMULSION INTRAVENOUS at 14:36

## 2018-01-14 NOTE — ED NOTES
Pt c/o slipping and falling on ice yesterday and has left wrist pain     Janny Julien RN  01/14/18 8787

## 2018-01-14 NOTE — ED PROVIDER NOTES
EMERGENCY DEPARTMENT ENCOUNTER    CHIEF COMPLAINT  Chief Complaint: pain post fall  History given by: patient, family  History limited by: N/A  Room Number: 38/38  PMD: СВЕТЛАНА Bettencourt      HPI:  Pt is a 67 y.o. female who presents s/p fall that occurred last night. She states that she slipped on ice and fell. When she landed, her left hand was outstretched and her left wrist was twisted behind her. She notes that she did not have any prodrome before the fall (denies dizziness, syncope, chest pain, trouble breathing, abd pain, or any other sx). Since the incident, she has had pain and swelling to left wrist and left hand. The pain is exacerbated by LUE movement. From the fall, she denies blow to head, loss of consciousness, headache, neck pain, back pain, abd pain, chest pain, sensory loss, and sustaining any other injury. She has been taking aleve for pain without significant relief. She is right hand dominant. Past Medical History of asthma and hyperlipidemia.     Duration: fall occurred last night  Timing: constant  Location: left wrist, left hand  Radiation: none  Quality: pain  Intensity/Severity: moderate  Progression: unchanged  Associated Symptoms: left wrist pain and swelling, left hand pain and swelling  Aggravating Factors: LUE movement  Alleviating Factors: resting LUE  Previous Episodes: none  Treatment before arrival: Pt states that she has been taking aleve for pain without significant relief.     PAST MEDICAL HISTORY  Active Ambulatory Problems     Diagnosis Date Noted   • No Active Ambulatory Problems     Resolved Ambulatory Problems     Diagnosis Date Noted   • No Resolved Ambulatory Problems     Past Medical History:   Diagnosis Date   • Abdominal hernia    • Asthma    • Hyperlipidemia    • Thyroid nodule        PAST SURGICAL HISTORY  Past Surgical History:   Procedure Laterality Date   • BRONCHOSCOPY N/A 11/2/2017    Procedure: BRONCHOSCOPY WITH BAL and brushings;  Surgeon: David Herzog  MD Jasmin;  Location: [facility];  Service:    • COLON RESECTION     • COLONOSCOPY     • GANGLION CYST EXCISION Right    • TUBAL ABDOMINAL LIGATION     • VENTRAL HERNIA REPAIR N/A 6/20/2017    Procedure: VENTRAL HERNIA REPAIR LAPAROSCOPIC WITH DAVINCI ROBOT;  Surgeon: Nayeli Goldstein MD;  Location: [facility] MAIN OR;  Service:        FAMILY HISTORY  Family History   Problem Relation Age of Onset   • Breast cancer Mother    • Colon cancer Maternal Grandmother    • Breast cancer Maternal Aunt    • Pancreatic cancer Father    • Lymphoma Sister    • Malig Hyperthermia Neg Hx    • Ovarian cancer Neg Hx    • Uterine cancer Neg Hx    • Deep vein thrombosis Neg Hx    • Pulmonary embolism Neg Hx        SOCIAL HISTORY  Social History     Social History   • Marital status:      Spouse name: N/A   • Number of children: N/A   • Years of education: N/A     Occupational History   •       Social History Main Topics   • Smoking status: Former Smoker     Packs/day: 0.50     Years: 10.00     Types: Cigarettes   • Smokeless tobacco: Never Used      Comment: 33YRS AGO   • Alcohol use Yes      Comment: weekly   • Drug use: No   • Sexual activity: Defer     Other Topics Concern   • Not on file     Social History Narrative   • No narrative on file         ALLERGIES  Penicillins    REVIEW OF SYSTEMS  Review of Systems   Constitutional: Negative for chills and fever.   HENT: Negative for sore throat.    Respiratory: Negative for cough.    Gastrointestinal: Negative for abdominal pain, nausea and vomiting.   Genitourinary: Negative for dysuria.   Musculoskeletal: Negative for back pain and neck pain.        Left wrist pain and swelling, left hand pain and swelling   Neurological: Negative for numbness and headaches.   Psychiatric/Behavioral: The patient is not nervous/anxious.        PHYSICAL EXAM  ED Triage Vitals   Temp Heart Rate Resp BP SpO2   01/14/18 0957 01/14/18 0957 01/14/18 0957 01/14/18 0957 01/14/18 1000    98.6 °F (37 °C) 89 16 154/107 98 % WNL     Physical Exam   Constitutional: She is oriented to person, place, and time and well-developed, well-nourished, and in no distress.   HENT:   Head: Normocephalic and atraumatic.   Mouth/Throat: Mucous membranes are normal.   Eyes: No scleral icterus.   Neck: Normal range of motion.   Cardiovascular: Normal rate, regular rhythm and normal heart sounds.    Pulses:       Radial pulses are 2+ on the right side, and 2+ on the left side.   Pulmonary/Chest: Effort normal and breath sounds normal. No respiratory distress. She exhibits no tenderness.   Abdominal: Soft. She exhibits no distension. There is no tenderness.   Musculoskeletal: She exhibits deformity (deformity to left wrist).   Swelling and tenderness to left hand and left wrist, no tenderness to left elbow or left shoulder, FROM of left elbow and left shoulder without pain, NV intact distally to LUE   Neurological: She is alert and oriented to person, place, and time. She has normal sensation.   Skin: Skin is warm and dry.   Psychiatric: Mood and affect normal.   Nursing note and vitals reviewed.      RADIOLOGY            XR Wrist 3+ View Left (Final result) (pre reduction) Result time: 01/14/18 12:07:01     Final result by Elías Davison MD (01/14/18 12:07:01)     Narrative:     EMERGENCY IMAGING  LEFT WRIST 4 VIEWS  LEFT HAND 3 VIEWS     HISTORY: 67-year-old female who fell on outstretched hand last night     COMPARISON: None available     FINDINGS:  1. Comminuted fracture distal radius with impaction and angulation.  2. The styloid process of the ulna appears intact.  3. 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  4. No other evidence of acute process.     This report was finalized on 1/14/2018 12:07 PM by Dr. Elías Davison MD.               XR Wrist 2 View Left (Final result) (post  reduction) Result time: 01/14/18 15:22:11     Procedure changed from XR Wrist 3+ View Left          Final result by Elías Davison MD (01/14/18 15:22:11)     Narrative:     EMERGENCY IMAGING LEFT WRIST 2 VIEWS     HISTORY: Post closed reduction distal radial fracture     COMPARISON: Prereduction imaging of earlier today     FINDINGS:  1. Mild interval improvement in alignment with details obscured by  overlying splint.     This report was finalized on 1/14/2018 3:22 PM by Dr. Elías Davison MD.            I ordered the above noted radiological studies and reviewed the images on the PACS system.        PROCEDURES    FX Dislocation  Date/Time: 1/14/2018 2:34 PM  Performed by: YOU BENNETT  Authorized by: LISE TEJEDA     Consent:     Consent obtained:  Verbal and written    Consent given by:  Patient    Risks discussed:  Pain  Injury:     Injury location:  Forearm    Forearm injury location:  L forearm    Forearm fracture type: distal radial    Pre-procedure assessment:     Neurological function: normal      Distal perfusion: normal      Range of motion: reduced    Sedation:     Sedation type:  Moderate (conscious) sedation (60mg propofol )  Anesthesia (see MAR for exact dosages):     Anesthesia method:  None  Procedure details:     Manipulation performed: yes      Reduction successful: yes      X-ray confirmed reduction: yes      Immobilization:  Sling and splint    Splint type:  Sugar tong    Supplies used:  Ortho-Glass  Post-procedure assessment:     Neurological function: normal      Distal perfusion: normal      Range of motion: improved      Patient tolerance of procedure:  Tolerated well, no immediate complications  Comments:        2:34 PM- Dr Tejeda administered 40mg propofol for moderate sedation prior to reduction/realignment of left distal radius fracture.     2:35 PM- Started reduction/realignment of left distal radius fracture.     2:36 PM- Administered 20mg propofol.     2:38 PM-  Finished reduction/realignment of left distal radius fracture. Applied splint to GEORGE. Will obtain post reduction left wrist xray to confirm improved alignment.             PROGRESS AND CONSULTS  10:33 AM- Reviewed pt's history and workup with Dr. Velazquez.  At bedside evaluation, they agree with the plan of care.  10:40 AM- Ordered norco for pain.   12:16 PM- Left wrist xray/left hand xray show left distal radius fracture with impaction and angulation. Sent call out to on call orthopedic surgeon for consult.   1:06 PM- Dr Rosenthal (orthopedic surgeon) is currently in surgery and will call us back once he has finished with procedure.  1:13 PM- Discussed case with Dr Rosenthal (orthopedic surgeon)   Reviewed history, exam, results and treatments.  Discussed concerns and plan of care. Dr Rosenthal would like for us to attempt to reduce/realign left distal radius fracture in ED. He states that he will try to see pt in ED but if he is unable to, he will follow up with pt in the office.   1:34 PM- Dr Rosenthal is at pt's bedside for evaluation and has provided pt with follow up details and instructions.   1:37 PM- Rechecked pt. She is resting comfortably and is in no acute distress. Discussed with pt and family about left wrist xray/left hand xray findings (left distal radius fracture with impaction and angulation). Informed them of plan to attempt reduction/realignment of pt's left distal radius fracture under moderate sedation. They verbalize understanding and agreement with plan.   2:34 PM- Performed reduction/realignment of left distal radius fracture under moderate sedation. Will obtain post reduction left wrist xray to confirm improved alignment. See Dr Velazquez's note regarding details of moderate sedation. See above procedure note regarding details of fracture reduction and splint application.   2:48 PM- Rechecked pt. She is resting comfortably and is in no acute distress. She has returned to baseline mental  status.   3:30 PM- Rechecked pt. She remains in no acute distress. Reviewed implications of results (including post reduction left wrist xray findings (mild improvement of alignment of left distal radius fracture)), diagnosis, meds, responsibility to follow up, warning signs and symptoms of possible worsening, potential complications and reasons to return to ER with patient.  Discussed all results and noted any abnormalities with patient.  Discussed absolute need to recheck abnormalities and condition with Dr Rosenthal (orthopedic surgeon) and also with PMD. Instructed pt to call tomorrow morning to arrange follow up appointment at Dr Rosenthal's office. Informed pt of plan to prescribe short course of pain medication. Advised pt to rest LUE, to apply ice to left wrist, to elevate LUE, and to wear splint.   Discussed plan for discharge, as there is no emergent indication for admission.  Pt is agreeable and understands need for follow up and repeat testing.  Pt is aware that discharge does not mean that nothing is wrong but it indicates no emergency is present.  Pt is discharged with instructions to follow up with primary care doctor to have their blood pressure rechecked.       DIAGNOSIS  Final diagnoses:   Closed fracture of distal end of left radius, unspecified fracture morphology, initial encounter   Fall, initial encounter       FOLLOW UP   Petra Webber, APRN  [address]  [facility] [id number] [phone]    Call in 1 day      Bhanu Rosenthal MD  [address]  [facility] [id number] [phone]    Call in 1 day        RX     Medication List      oxyCODONE-acetaminophen 5-325 MG per tablet   Commonly known as:  PERCOCET   Take 1 tablet by mouth Every 4 (Four) Hours As Needed for Moderate Pain .           Madi report [id number] reviewed.  Risks, benefits, alternatives discussed with patient.  Pt consents to treatment and agrees to follow up with PMD tomorrow for further care and  "any other prescriptions.       COURSE & MEDICAL DECISION MAKING  Pertinent Imaging studies that were ordered and reviewed are noted above.  Results were reviewed/discussed with the patient and they were also made aware of online assess.       MEDICATIONS GIVEN IN ER  Medications   sodium chloride 0.9 % flush 10 mL (not administered)   HYDROcodone-acetaminophen (NORCO) 7.5-325 MG per tablet 1 tablet (1 tablet Oral Given 1/14/18 1100)   Propofol (DIPRIVAN) injection 20 mg (20 mg Intravenous Given 1/14/18 1436)   Propofol (DIPRIVAN) injection 40 mg (40 mg Intravenous Given 1/14/18 1434)       /92  Pulse 78  Temp 98.6 °F (37 °C) (Tympanic)   Resp 18  Ht 175.3 cm (69\")  Wt 68 kg (150 lb)  SpO2 94%  BMI 22.15 kg/m2      I personally reviewed the past medical history, past surgical history, social history, family history, current medications and allergies as they appear in this chart.  The scribe's note accurately reflects the work and decisions made by me.     Documentation assistance provided by linette Wood for MEGA Latif on 1/14/2018 at 2:46 PM. Information recorded by the scribe was done at my direction and has been verified and validated by me.      Paul Wood  01/14/18 1620       СВЕТЛАНА Weber  01/14/18 1932    "

## 2018-01-14 NOTE — ED PROVIDER NOTES
Pt is a 67 y.o. female who presents s/p mechanical fall last night c/o L wrist injury. Pt states she slipped on the ice and fell, landing on her outstretched L hand. Pt denies sensory changes but does have decreased ROM to her L wrist. Pt denies head injury, LOC or any other injuries from the fall.     PE:  Resting comfortably, no distress  Vitals stable  Deformity and bruising to L wrist with normal sensation and intact distal pulses    Plan to XR L wrist.     1:36 PM  XR L wrist shows comminuted distal radius fracture with impaction and angulation. Plan to reduce and splint for discharge. Dr. Rosenthal (orthopedic surgeon) agrees to see the Pt in the ED.    1:41 PM  Dr. Rosenthal has evaluated the Pt in the ED and would like to continue with reduction and splinting, agrees the Pt will need surgical repair.      Procedural Sedation  Date/Time: 1/14/2018 2:32 PM  Performed by: LISE TEJEDA  Authorized by: LISE TEJEDA     Consent:     Consent obtained:  Verbal and written    Consent given by:  Patient    Risks discussed:  Allergic reaction, inadequate sedation, vomiting and nausea  Indications:     Procedure performed:  Fracture reduction    Procedure necessitating sedation performed by:  Physician performing sedation    Intended level of sedation:  Moderate (conscious sedation)  Pre-sedation assessment:     ASA classification: class 1 - normal, healthy patient      Neck mobility: normal      Mouth opening:  3 or more finger widths    Thyromental distance:  4 finger widths    Mallampati score:  I - soft palate, uvula, fauces, pillars visible    Pre-sedation assessments completed and reviewed: airway patency, pain level and respiratory function      History of difficult intubation: no      Pre-sedation assessment completed:  1/14/2018 2:32 PM  Immediate pre-procedure details:     Reassessment: Patient reassessed immediately prior to procedure      Reviewed: vital signs      Verified: bag valve mask available,  oxygen available and suction available    Procedure details (see MAR for exact dosages):     Sedation start time:  1/14/2018 2:33 PM    Preoxygenation:  Nasal cannula    Sedation:  Propofol (60 mg)    Intra-procedure monitoring:  Blood pressure monitoring, cardiac monitor, continuous capnometry, continuous pulse oximetry, frequent LOC assessments and frequent vital sign checks    Intra-procedure events: none      Sedation end time:  1/14/2018 2:40 PM  Post-procedure details:     Post-sedation assessment completed:  1/14/2018 2:40 PM    Attendance: Constant attendance by certified staff until patient recovered      Recovery: Patient returned to pre-procedure baseline      Post-sedation assessments completed and reviewed: mental status, pain level and respiratory function      Patient is stable for discharge or admission: yes      Patient tolerance:  Tolerated well, no immediate complications          I supervised care provided by the midlevel provider СВЕТЛАНА Latif.    We have discussed this patient's history, physical exam, and treatment plan.   I have reviewed the note and personally saw and examined the patient and agree with the plan of care.    Documentation assistance provided by linette Benjamin for Dr. Velazquez.  Information recorded by the scribe was done at my direction and has been verified and validated by me.          Coreen Benjamin  01/14/18 1730       Ayden Velazquez MD  01/14/18 0429

## 2018-01-14 NOTE — DISCHARGE INSTRUCTIONS
Medications as ordered  Rest, ice, elevate and wear splint until follow up with Dr Rosenthal-call in am for appointment  Return to er for increased pain/swelling or any new or worsening symptoms

## 2018-01-15 ENCOUNTER — PREP FOR SURGERY (OUTPATIENT)
Dept: OTHER | Facility: HOSPITAL | Age: 68
End: 2018-01-15

## 2018-01-15 DIAGNOSIS — S52.572A OTHER CLOSED INTRA-ARTICULAR FRACTURE OF DISTAL END OF LEFT RADIUS, INITIAL ENCOUNTER: Primary | ICD-10-CM

## 2018-01-15 RX ORDER — CLINDAMYCIN PHOSPHATE 900 MG/50ML
900 INJECTION INTRAVENOUS ONCE
Status: CANCELLED | OUTPATIENT
Start: 2018-01-19 | End: 2018-01-19

## 2018-01-16 PROBLEM — S52.502A CLOSED FRACTURE OF LEFT DISTAL RADIUS: Status: ACTIVE | Noted: 2018-01-16

## 2018-01-16 NOTE — H&P
LEFT distal radius fracture with displacement     History of Present Illness: 67 y.o. female  Who presented to [facility] emergency room following a history of fall, having slipped on ice.  As I Dr Rosenthal was on call for the emergency room and was consulted for further evaluation and treatment. I have seen the patient in the emergency department.  The patient is accompanied by her  to the hospital visit.  She denies any other injuries.  She noticed sudden onset of severe pain in the LEFT wrist associated with deformity.  She denies any tingling, numbness of the fingers.  Denies any history of loss of consciousness.  She works as a part-time  for middle school.  The patient has a history of osteopenia and is currently on Fosamax.     Patient denies any history of: DVT/PE, MRSA, COPD,  CHF, CAD, or Atrial fibrillation, Diabetes Mellitus.   Patient has a history of :  Patient is not taking anticoagulants.      Past medical history, Past surgical history, family history, Social history, current medications, home medications Have been reviewed by me.      Medical History         Past Medical History:   Diagnosis Date   • Abdominal hernia     • Asthma     • Hyperlipidemia     • Thyroid nodule            Surgical History          Past Surgical History:   Procedure Laterality Date   • BRONCHOSCOPY N/A 11/2/2017     Procedure: BRONCHOSCOPY WITH BAL and brushings;  Surgeon: David Castellanos MD;  Location: [facility] ENDOSCOPY;  Service:    • COLON RESECTION       • COLONOSCOPY       • GANGLION CYST EXCISION Right     • TUBAL ABDOMINAL LIGATION       • VENTRAL HERNIA REPAIR N/A 6/20/2017     Procedure: VENTRAL HERNIA REPAIR LAPAROSCOPIC WITH DAVINCI ROBOT;  Surgeon: Nayeli Goldstein MD;  Location: [facility] MAIN OR;  Service:          Social History           Occupational History   •                 Social History Main Topics   • Smoking status: Former Smoker       Packs/day: 0.50        Years: 10.00       Types: Cigarettes   • Smokeless tobacco: Never Used         Comment: 33YRS AGO   • Alcohol use Yes          Comment: weekly   • Drug use: No   • Sexual activity: Defer    Social History          Social History Narrative   • No narrative on file      Family History   Problem Relation Age of Onset   • Breast cancer Mother     • Colon cancer Maternal Grandmother     • Breast cancer Maternal Aunt     • Pancreatic cancer Father     • Lymphoma Sister     • Malig Hyperthermia Neg Hx     • Ovarian cancer Neg Hx     • Uterine cancer Neg Hx     • Deep vein thrombosis Neg Hx     • Pulmonary embolism Neg Hx                    Allergies   Allergen Reactions   • Penicillins Rash          Home Medications:     Prescriptions Prior to Admission       (Not in a hospital admission)        Current Medications:  Scheduled Meds:  Continuous Infusions:  No current facility-administered medications for this encounter.   PRN Meds:.     Review of Systems:   A 12 point system review was reviewed with the patient and from the chart  and is negative except as mentioned in history of present illness.       Physical Exam: 67 y.o. female         Vitals    Vitals:     01/14/18 1441 01/14/18 1442 01/14/18 1444 01/14/18 1536   BP: 134/91 134/91   146/92   BP Location: Right arm         Patient Position: Lying         Pulse: 76 71 68 78   Resp: 16   16 18   Temp:       98.6 °F (37 °C)   TempSrc:       Tympanic   SpO2: 99%   98% 94%   Weight:           Height:                     Gait: Not evaluated.      Mental/HEENT/cardio/skin: The patient's general appearance was well-nourished, well-hydrated, no acute distress.  Orientation was alert and oriented ×3.  The patient's mood was normal.   Pulmonary exam shows normal late exchange, no labored breathing, or shortness of breath.    The  skin exam showed normal temperature and color in the area of examination.     Extremities: LEFT wrist, positive deformity.  Positive tenderness.   Attempted movements of the wrist are painful and restricted.  She is able to move the fingers gently.  Gross sensation is intact over the fingers.  Good capillary refill. No sign of carpal tunnel syndrome.     Pulses: Unable to palpate the radial pulse, secondary to swelling and tenderness.     Diagnostic Tests:                 No results found for: CRYSTAL]  No results found for: CULTURE]  No results found for: URICACID]  )Xr Wrist 2 View Left     Result Date: 1/14/2018  Narrative: EMERGENCY IMAGING LEFT WRIST 2 VIEWS  HISTORY: Post closed reduction distal radial fracture  COMPARISON: Prereduction imaging of earlier today  FINDINGS: 1. Mild interval improvement in alignment with details obscured by overlying splint.  This report was finalized on 1/14/2018 3:22 PM by Dr. Elías Davison MD.       Xr Wrist 3+ View Left     Result Date: 1/14/2018  Narrative: EMERGENCY IMAGING LEFT WRIST 4 VIEWS LEFT HAND 3 VIEWS  HISTORY: 67-year-old female who fell on outstretched hand last night  COMPARISON: None available  FINDINGS: 1. Comminuted fracture distal radius with impaction and angulation. 2. The styloid process of the ulna appears intact. 3. 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 4. No other evidence of acute process.  This report was finalized on 1/14/2018 12:07 PM by Dr. Elías Davison MD.       Xr Hand 3+ View Left     Result Date: 1/14/2018  Narrative: EMERGENCY IMAGING LEFT WRIST 4 VIEWS LEFT HAND 3 VIEWS  HISTORY: 67-year-old female who fell on outstretched hand last night  COMPARISON: None available  FINDINGS: 1. Comminuted fracture distal radius with impaction and angulation. 2. The styloid process of the ulna appears intact. 3. 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 4. No other evidence of acute process.  This report was finalized on 1/14/2018 12:07 PM by Dr. Elías Davison MD.          Assessment: Comminuted LEFT wrist distal radius fracture with impaction.        Plan:  Options and alternatives were discussed in detail with the patient and  Her . The patient has significant loss of radial height inclination and loss of volar tilt associated with osteopenia. The patient is indicated for Closed reduction and cast placement versus open reduction internal fixation.      The patient voiced understanding of the risks, benefits, and alternative forms of treatment that were discussed including but not limited to  Infection,  malunion, nonunion, Posttraumatic stiffness, posttraumatic arthritis, medical risks including stroke, heart attack have been discussed. Despite the risks involved the patient and Her  consent to proceed with Open reduction and internal fixation of the LEFT wrist distal radius fracture.      The patient   will be scheduled for the above surgery tentatively for This week .

## 2018-01-16 NOTE — CONSULTS
Orthopedic Consult      Patient: April Feliz  YOB: 1950     Date of Admission: 1/14/2018 10:05 AM    Medical Record Number: [id number]    Attending Physician: No att. providers found    Consulting Physician: Bhanu Rosenthal MD    Reason for Consult: LEFT distal radius fracture with displacement    History of Present Illness: 67 y.o. female  Who presented to [facility] emergency room following a history of fall, having slipped on ice.  As I Dr Rosenthal was on call for the emergency room and was consulted for further evaluation and treatment. I have seen the patient in the emergency department.  The patient is accompanied by her  to the hospital visit.  She denies any other injuries.  She noticed sudden onset of severe pain in the LEFT wrist associated with deformity.  She denies any tingling, numbness of the fingers.  Denies any history of loss of consciousness.  She works as a part-time  for middle school.  The patient has a history of osteopenia and is currently on Fosamax.    Patient denies any history of: DVT/PE, MRSA, COPD,  CHF, CAD, or Atrial fibrillation, Diabetes Mellitus.   Patient has a history of :  Patient is not taking anticoagulants.     Past medical history, Past surgical history, family history, Social history, current medications, home medications Have been reviewed by me.    Past Medical History:   Diagnosis Date   • Abdominal hernia    • Asthma    • Hyperlipidemia    • Thyroid nodule      Past Surgical History:   Procedure Laterality Date   • BRONCHOSCOPY N/A 11/2/2017    Procedure: BRONCHOSCOPY WITH BAL and brushings;  Surgeon: David Castellanos MD;  Location: [facility] ENDOSCOPY;  Service:    • COLON RESECTION     • COLONOSCOPY     • GANGLION CYST EXCISION Right    • TUBAL ABDOMINAL LIGATION     • VENTRAL HERNIA REPAIR N/A 6/20/2017    Procedure: VENTRAL HERNIA REPAIR LAPAROSCOPIC WITH DAVINCI ROBOT;  Surgeon: Nayeli Goldstein MD;   Location: [facility] MAIN OR;  Service:      Social History     Occupational History   •       Social History Main Topics   • Smoking status: Former Smoker     Packs/day: 0.50     Years: 10.00     Types: Cigarettes   • Smokeless tobacco: Never Used      Comment: 33YRS AGO   • Alcohol use Yes      Comment: weekly   • Drug use: No   • Sexual activity: Defer    Social History     Social History Narrative   • No narrative on file     Family History   Problem Relation Age of Onset   • Breast cancer Mother    • Colon cancer Maternal Grandmother    • Breast cancer Maternal Aunt    • Pancreatic cancer Father    • Lymphoma Sister    • Malig Hyperthermia Neg Hx    • Ovarian cancer Neg Hx    • Uterine cancer Neg Hx    • Deep vein thrombosis Neg Hx    • Pulmonary embolism Neg Hx           Allergies   Allergen Reactions   • Penicillins Rash        Home Medications:    (Not in a hospital admission)    Current Medications:  Scheduled Meds:  Continuous Infusions:  No current facility-administered medications for this encounter.   PRN Meds:.    Review of Systems:   A 12 point system review was reviewed with the patient and from the chart  and is negative except as mentioned in history of present illness.      Physical Exam: 67 y.o. female                    Vitals:    01/14/18 1441 01/14/18 1442 01/14/18 1444 01/14/18 1536   BP: 134/91 134/91  146/92   BP Location: Right arm      Patient Position: Lying      Pulse: 76 71 68 78   Resp: 16  16 18   Temp:    98.6 °F (37 °C)   TempSrc:    Tympanic   SpO2: 99%  98% 94%   Weight:       Height:            Gait: Not evaluated.     Mental/HEENT/cardio/skin: The patient's general appearance was well-nourished, well-hydrated, no acute distress.  Orientation was alert and oriented ×3.  The patient's mood was normal.   Pulmonary exam shows normal late exchange, no labored breathing, or shortness of breath.    The  skin exam showed normal temperature and color in the area of  examination.    Extremities: LEFT wrist, positive deformity.  Positive tenderness.  Attempted movements of the wrist are painful and restricted.  She is able to move the fingers gently.  Gross sensation is intact over the fingers.  Good capillary refill. No sign of carpal tunnel syndrome.    Pulses: Unable to palpate the radial pulse, secondary to swelling and tenderness.    Diagnostic Tests:                No results found for: CRYSTAL]  No results found for: CULTURE]  No results found for: URICACID]  )Xr Wrist 2 View Left    Result Date: 1/14/2018  Narrative: EMERGENCY IMAGING LEFT WRIST 2 VIEWS  HISTORY: Post closed reduction distal radial fracture  COMPARISON: Prereduction imaging of earlier today  FINDINGS: 1. Mild interval improvement in alignment with details obscured by overlying splint.  This report was finalized on 1/14/2018 3:22 PM by Dr. Elías Davison MD.      Xr Wrist 3+ View Left    Result Date: 1/14/2018  Narrative: EMERGENCY IMAGING LEFT WRIST 4 VIEWS LEFT HAND 3 VIEWS  HISTORY: 67-year-old female who fell on outstretched hand last night  COMPARISON: None available  FINDINGS: 1. Comminuted fracture distal radius with impaction and angulation. 2. The styloid process of the ulna appears intact. 3. 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 4. No other evidence of acute process.  This report was finalized on 1/14/2018 12:07 PM by Dr. Elías Davison MD.      Xr Hand 3+ View Left    Result Date: 1/14/2018  Narrative: EMERGENCY IMAGING LEFT WRIST 4 VIEWS LEFT HAND 3 VIEWS  HISTORY: 67-year-old female who fell on outstretched hand last night  COMPARISON: None available  FINDINGS: 1. Comminuted fracture distal radius with impaction and angulation. 2. The styloid process of the ulna appears intact. 3. 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 4. No other evidence of acute process.  This report was finalized on 1/14/2018 12:07 PM by Dr. Elías Davison MD.        Assessment: Comminuted LEFT wrist distal radius fracture with impaction.      Plan:  Options and alternatives were discussed in detail with the patient and  Her . The patient has significant loss of radial height inclination and loss of volar tilt associated with osteopenia. The patient is indicated for Closed reduction and cast placement versus open reduction internal fixation.     The patient voiced understanding of the risks, benefits, and alternative forms of treatment that were discussed including but not limited to  Infection,  malunion, nonunion, Posttraumatic stiffness, posttraumatic arthritis, medical risks including stroke, heart attack have been discussed. Despite the risks involved the patient and Her  consent to proceed with Open reduction and internal fixation of the LEFT wrist distal radius fracture.     The patient   will be scheduled for the above surgery tentatively for This week .    I have requested the emergency department physician.  Closed reduction and splint placement to improve the overall alignment of the wrist.  Following this, the patient will be discharged home.    Date: 1/15/2018    Bhanu Rosenthal MD     CC: СВЕТЛАНА Bettencourt; Bhanu Rosenthal MD No att. providers found

## 2018-01-19 ENCOUNTER — ANESTHESIA EVENT (OUTPATIENT)
Dept: PERIOP | Facility: HOSPITAL | Age: 68
End: 2018-01-19

## 2018-01-19 ENCOUNTER — HOSPITAL ENCOUNTER (OUTPATIENT)
Facility: HOSPITAL | Age: 68
Setting detail: HOSPITAL OUTPATIENT SURGERY
Discharge: HOME | End: 2018-01-19
Attending: ORTHOPAEDIC SURGERY | Admitting: ORTHOPAEDIC SURGERY

## 2018-01-19 ENCOUNTER — ANESTHESIA (OUTPATIENT)
Dept: PERIOP | Facility: HOSPITAL | Age: 68
End: 2018-01-19

## 2018-01-19 ENCOUNTER — APPOINTMENT (OUTPATIENT)
Dept: GENERAL RADIOLOGY | Facility: HOSPITAL | Age: 68
End: 2018-01-19

## 2018-01-19 VITALS
HEART RATE: 62 BPM | TEMPERATURE: 98 F | DIASTOLIC BLOOD PRESSURE: 100 MMHG | OXYGEN SATURATION: 94 % | WEIGHT: 157 LBS | HEIGHT: 70 IN | RESPIRATION RATE: 16 BRPM | BODY MASS INDEX: 22.48 KG/M2 | SYSTOLIC BLOOD PRESSURE: 136 MMHG

## 2018-01-19 DIAGNOSIS — S52.572A OTHER CLOSED INTRA-ARTICULAR FRACTURE OF DISTAL END OF LEFT RADIUS, INITIAL ENCOUNTER: ICD-10-CM

## 2018-01-19 LAB
ANION GAP SERPL CALCULATED.3IONS-SCNC: 8 MMOL/L
BASOPHILS # BLD AUTO: 0.04 10*3/MM3 (ref 0–0.2)
BASOPHILS NFR BLD AUTO: 0.6 % (ref 0–1.5)
BUN BLD-MCNC: 8 MG/DL (ref 8–23)
BUN/CREAT SERPL: 13.3 (ref 7–25)
CALCIUM SPEC-SCNC: 8.9 MG/DL (ref 8.6–10.5)
CHLORIDE SERPL-SCNC: 101 MMOL/L (ref 98–107)
CO2 SERPL-SCNC: 28 MMOL/L (ref 22–29)
CREAT BLD-MCNC: 0.6 MG/DL (ref 0.57–1)
DEPRECATED RDW RBC AUTO: 44.7 FL (ref 37–54)
EOSINOPHIL # BLD AUTO: 0.19 10*3/MM3 (ref 0–0.7)
EOSINOPHIL NFR BLD AUTO: 2.6 % (ref 0.3–6.2)
ERYTHROCYTE [DISTWIDTH] IN BLOOD BY AUTOMATED COUNT: 12.3 % (ref 11.7–13)
GFR SERPL CREATININE-BSD FRML MDRD: 100 ML/MIN/1.73
GLUCOSE BLD-MCNC: 93 MG/DL (ref 65–99)
HCT VFR BLD AUTO: 43.5 % (ref 35.6–45.5)
HGB BLD-MCNC: 14.2 G/DL (ref 11.9–15.5)
IMM GRANULOCYTES # BLD: 0 10*3/MM3 (ref 0–0.03)
IMM GRANULOCYTES NFR BLD: 0 % (ref 0–0.5)
LYMPHOCYTES # BLD AUTO: 2.16 10*3/MM3 (ref 0.9–4.8)
LYMPHOCYTES NFR BLD AUTO: 29.7 % (ref 19.6–45.3)
MCH RBC QN AUTO: 32.3 PG (ref 26.9–32)
MCHC RBC AUTO-ENTMCNC: 32.6 G/DL (ref 32.4–36.3)
MCV RBC AUTO: 99.1 FL (ref 80.5–98.2)
MONOCYTES # BLD AUTO: 0.69 10*3/MM3 (ref 0.2–1.2)
MONOCYTES NFR BLD AUTO: 9.5 % (ref 5–12)
NEUTROPHILS # BLD AUTO: 4.19 10*3/MM3 (ref 1.9–8.1)
NEUTROPHILS NFR BLD AUTO: 57.6 % (ref 42.7–76)
PLATELET # BLD AUTO: 334 10*3/MM3 (ref 140–500)
PMV BLD AUTO: 9.7 FL (ref 6–12)
POTASSIUM BLD-SCNC: 3.8 MMOL/L (ref 3.5–5.2)
RBC # BLD AUTO: 4.39 10*6/MM3 (ref 3.9–5.2)
SODIUM BLD-SCNC: 137 MMOL/L (ref 136–145)
WBC NRBC COR # BLD: 7.27 10*3/MM3 (ref 4.5–10.7)

## 2018-01-19 PROCEDURE — 25010000002 METHYLPREDNISOLONE PER 125 MG: Performed by: ANESTHESIOLOGY

## 2018-01-19 PROCEDURE — C1713 ANCHOR/SCREW BN/BN,TIS/BN: HCPCS | Performed by: ORTHOPAEDIC SURGERY

## 2018-01-19 PROCEDURE — 93005 ELECTROCARDIOGRAM TRACING: CPT | Performed by: ORTHOPAEDIC SURGERY

## 2018-01-19 PROCEDURE — 25010000002 ONDANSETRON PER 1 MG: Performed by: NURSE ANESTHETIST, CERTIFIED REGISTERED

## 2018-01-19 PROCEDURE — 25010000002 FENTANYL CITRATE (PF) 100 MCG/2ML SOLUTION: Performed by: ANESTHESIOLOGY

## 2018-01-19 PROCEDURE — 25010000002 MIDAZOLAM PER 1 MG: Performed by: ANESTHESIOLOGY

## 2018-01-19 PROCEDURE — 76000 FLUOROSCOPY <1 HR PHYS/QHP: CPT

## 2018-01-19 PROCEDURE — 93010 ELECTROCARDIOGRAM REPORT: CPT | Performed by: INTERNAL MEDICINE

## 2018-01-19 PROCEDURE — 25010000002 ROPIVACAINE PER 1 MG: Performed by: ANESTHESIOLOGY

## 2018-01-19 PROCEDURE — 25010000002 DEXAMETHASONE PER 1 MG: Performed by: NURSE ANESTHETIST, CERTIFIED REGISTERED

## 2018-01-19 PROCEDURE — 73100 X-RAY EXAM OF WRIST: CPT

## 2018-01-19 PROCEDURE — 25010000002 FENTANYL CITRATE (PF) 100 MCG/2ML SOLUTION: Performed by: NURSE ANESTHETIST, CERTIFIED REGISTERED

## 2018-01-19 PROCEDURE — 25010000002 PROPOFOL 10 MG/ML EMULSION: Performed by: NURSE ANESTHETIST, CERTIFIED REGISTERED

## 2018-01-19 PROCEDURE — 85025 COMPLETE CBC W/AUTO DIFF WBC: CPT | Performed by: ORTHOPAEDIC SURGERY

## 2018-01-19 PROCEDURE — 25010000002 KETOROLAC TROMETHAMINE PER 15 MG: Performed by: NURSE ANESTHETIST, CERTIFIED REGISTERED

## 2018-01-19 PROCEDURE — 80048 BASIC METABOLIC PNL TOTAL CA: CPT | Performed by: ORTHOPAEDIC SURGERY

## 2018-01-19 DEVICE — SCRW LK VA LP TI 2.4X20MM: Type: IMPLANTABLE DEVICE | Site: WRIST | Status: FUNCTIONAL

## 2018-01-19 DEVICE — IMPLANTABLE DEVICE: Type: IMPLANTABLE DEVICE | Site: WRIST | Status: FUNCTIONAL

## 2018-01-19 DEVICE — PLT VOLR DIST/RAD STD 3H LT: Type: IMPLANTABLE DEVICE | Site: WRIST | Status: FUNCTIONAL

## 2018-01-19 DEVICE — SCRW LP NL TI 3.5X14MM: Type: IMPLANTABLE DEVICE | Site: WRIST | Status: FUNCTIONAL

## 2018-01-19 DEVICE — SCRW LK VA LP TI 2.4X18MM: Type: IMPLANTABLE DEVICE | Site: WRIST | Status: FUNCTIONAL

## 2018-01-19 DEVICE — SCRW LK VA LP TI 2.4X16MM: Type: IMPLANTABLE DEVICE | Site: WRIST | Status: FUNCTIONAL

## 2018-01-19 DEVICE — SCRW LK TI 3.5X14MM: Type: IMPLANTABLE DEVICE | Site: WRIST | Status: FUNCTIONAL

## 2018-01-19 RX ORDER — LIDOCAINE HYDROCHLORIDE 10 MG/ML
0.5 INJECTION, SOLUTION EPIDURAL; INFILTRATION; INTRACAUDAL; PERINEURAL ONCE AS NEEDED
Status: DISCONTINUED | OUTPATIENT
Start: 2018-01-19 | End: 2018-01-19 | Stop reason: HOSPADM

## 2018-01-19 RX ORDER — HYDROCODONE BITARTRATE AND ACETAMINOPHEN 5; 325 MG/1; MG/1
1-2 TABLET ORAL EVERY 4 HOURS PRN
Qty: 20 TABLET | Refills: 0 | Status: SHIPPED | OUTPATIENT
Start: 2018-01-19 | End: 2018-08-21 | Stop reason: SDUPTHER

## 2018-01-19 RX ORDER — PROMETHAZINE HYDROCHLORIDE 25 MG/1
25 TABLET ORAL ONCE AS NEEDED
Status: DISCONTINUED | OUTPATIENT
Start: 2018-01-19 | End: 2018-01-19 | Stop reason: HOSPADM

## 2018-01-19 RX ORDER — OXYCODONE AND ACETAMINOPHEN 7.5; 325 MG/1; MG/1
1 TABLET ORAL ONCE AS NEEDED
Status: DISCONTINUED | OUTPATIENT
Start: 2018-01-19 | End: 2018-01-19 | Stop reason: HOSPADM

## 2018-01-19 RX ORDER — FAMOTIDINE 10 MG/ML
20 INJECTION, SOLUTION INTRAVENOUS ONCE
Status: COMPLETED | OUTPATIENT
Start: 2018-01-19 | End: 2018-01-19

## 2018-01-19 RX ORDER — PROMETHAZINE HYDROCHLORIDE 25 MG/1
25 SUPPOSITORY RECTAL ONCE AS NEEDED
Status: DISCONTINUED | OUTPATIENT
Start: 2018-01-19 | End: 2018-01-19 | Stop reason: HOSPADM

## 2018-01-19 RX ORDER — HYDROMORPHONE HCL 110MG/55ML
0.5 PATIENT CONTROLLED ANALGESIA SYRINGE INTRAVENOUS
Status: DISCONTINUED | OUTPATIENT
Start: 2018-01-19 | End: 2018-01-19 | Stop reason: HOSPADM

## 2018-01-19 RX ORDER — DEXAMETHASONE SODIUM PHOSPHATE 10 MG/ML
INJECTION INTRAMUSCULAR; INTRAVENOUS AS NEEDED
Status: DISCONTINUED | OUTPATIENT
Start: 2018-01-19 | End: 2018-01-19 | Stop reason: SURG

## 2018-01-19 RX ORDER — ONDANSETRON 2 MG/ML
4 INJECTION INTRAMUSCULAR; INTRAVENOUS ONCE AS NEEDED
Status: DISCONTINUED | OUTPATIENT
Start: 2018-01-19 | End: 2018-01-19 | Stop reason: HOSPADM

## 2018-01-19 RX ORDER — MIDAZOLAM HYDROCHLORIDE 1 MG/ML
1 INJECTION INTRAMUSCULAR; INTRAVENOUS
Status: DISCONTINUED | OUTPATIENT
Start: 2018-01-19 | End: 2018-01-19 | Stop reason: HOSPADM

## 2018-01-19 RX ORDER — NALOXONE HCL 0.4 MG/ML
0.2 VIAL (ML) INJECTION AS NEEDED
Status: DISCONTINUED | OUTPATIENT
Start: 2018-01-19 | End: 2018-01-19 | Stop reason: HOSPADM

## 2018-01-19 RX ORDER — PROMETHAZINE HYDROCHLORIDE 25 MG/1
12.5 TABLET ORAL ONCE AS NEEDED
Status: DISCONTINUED | OUTPATIENT
Start: 2018-01-19 | End: 2018-01-19 | Stop reason: HOSPADM

## 2018-01-19 RX ORDER — FENTANYL CITRATE 50 UG/ML
50 INJECTION, SOLUTION INTRAMUSCULAR; INTRAVENOUS
Status: DISCONTINUED | OUTPATIENT
Start: 2018-01-19 | End: 2018-01-19 | Stop reason: HOSPADM

## 2018-01-19 RX ORDER — ONDANSETRON 2 MG/ML
INJECTION INTRAMUSCULAR; INTRAVENOUS AS NEEDED
Status: DISCONTINUED | OUTPATIENT
Start: 2018-01-19 | End: 2018-01-19 | Stop reason: SURG

## 2018-01-19 RX ORDER — LABETALOL HYDROCHLORIDE 5 MG/ML
5 INJECTION, SOLUTION INTRAVENOUS
Status: DISCONTINUED | OUTPATIENT
Start: 2018-01-19 | End: 2018-01-19 | Stop reason: HOSPADM

## 2018-01-19 RX ORDER — ROPIVACAINE HYDROCHLORIDE 5 MG/ML
INJECTION, SOLUTION EPIDURAL; INFILTRATION; PERINEURAL AS NEEDED
Status: DISCONTINUED | OUTPATIENT
Start: 2018-01-19 | End: 2018-01-19 | Stop reason: SURG

## 2018-01-19 RX ORDER — HYDROCODONE BITARTRATE AND ACETAMINOPHEN 7.5; 325 MG/1; MG/1
1 TABLET ORAL ONCE AS NEEDED
Status: COMPLETED | OUTPATIENT
Start: 2018-01-19 | End: 2018-01-19

## 2018-01-19 RX ORDER — MAGNESIUM HYDROXIDE 1200 MG/15ML
LIQUID ORAL AS NEEDED
Status: DISCONTINUED | OUTPATIENT
Start: 2018-01-19 | End: 2018-01-19 | Stop reason: HOSPADM

## 2018-01-19 RX ORDER — METHYLPREDNISOLONE SODIUM SUCCINATE 125 MG/2ML
INJECTION, POWDER, LYOPHILIZED, FOR SOLUTION INTRAMUSCULAR; INTRAVENOUS AS NEEDED
Status: DISCONTINUED | OUTPATIENT
Start: 2018-01-19 | End: 2018-01-19 | Stop reason: SURG

## 2018-01-19 RX ORDER — EPHEDRINE SULFATE 50 MG/ML
INJECTION, SOLUTION INTRAVENOUS AS NEEDED
Status: DISCONTINUED | OUTPATIENT
Start: 2018-01-19 | End: 2018-01-19 | Stop reason: SURG

## 2018-01-19 RX ORDER — PROMETHAZINE HYDROCHLORIDE 25 MG/ML
12.5 INJECTION, SOLUTION INTRAMUSCULAR; INTRAVENOUS ONCE AS NEEDED
Status: DISCONTINUED | OUTPATIENT
Start: 2018-01-19 | End: 2018-01-19 | Stop reason: HOSPADM

## 2018-01-19 RX ORDER — HYDRALAZINE HYDROCHLORIDE 20 MG/ML
5 INJECTION INTRAMUSCULAR; INTRAVENOUS
Status: DISCONTINUED | OUTPATIENT
Start: 2018-01-19 | End: 2018-01-19 | Stop reason: HOSPADM

## 2018-01-19 RX ORDER — FENTANYL CITRATE 50 UG/ML
INJECTION, SOLUTION INTRAMUSCULAR; INTRAVENOUS AS NEEDED
Status: DISCONTINUED | OUTPATIENT
Start: 2018-01-19 | End: 2018-01-19 | Stop reason: SURG

## 2018-01-19 RX ORDER — SODIUM CHLORIDE 0.9 % (FLUSH) 0.9 %
1-10 SYRINGE (ML) INJECTION AS NEEDED
Status: DISCONTINUED | OUTPATIENT
Start: 2018-01-19 | End: 2018-01-19 | Stop reason: HOSPADM

## 2018-01-19 RX ORDER — FLUMAZENIL 0.1 MG/ML
0.2 INJECTION INTRAVENOUS AS NEEDED
Status: DISCONTINUED | OUTPATIENT
Start: 2018-01-19 | End: 2018-01-19 | Stop reason: HOSPADM

## 2018-01-19 RX ORDER — CLINDAMYCIN PHOSPHATE 900 MG/50ML
900 INJECTION INTRAVENOUS ONCE
Status: COMPLETED | OUTPATIENT
Start: 2018-01-19 | End: 2018-01-19

## 2018-01-19 RX ORDER — KETOROLAC TROMETHAMINE 30 MG/ML
INJECTION, SOLUTION INTRAMUSCULAR; INTRAVENOUS AS NEEDED
Status: DISCONTINUED | OUTPATIENT
Start: 2018-01-19 | End: 2018-01-19 | Stop reason: SURG

## 2018-01-19 RX ORDER — SODIUM CHLORIDE, SODIUM LACTATE, POTASSIUM CHLORIDE, CALCIUM CHLORIDE 600; 310; 30; 20 MG/100ML; MG/100ML; MG/100ML; MG/100ML
9 INJECTION, SOLUTION INTRAVENOUS CONTINUOUS
Status: DISCONTINUED | OUTPATIENT
Start: 2018-01-19 | End: 2018-01-19 | Stop reason: HOSPADM

## 2018-01-19 RX ORDER — DIPHENHYDRAMINE HYDROCHLORIDE 50 MG/ML
12.5 INJECTION INTRAMUSCULAR; INTRAVENOUS
Status: DISCONTINUED | OUTPATIENT
Start: 2018-01-19 | End: 2018-01-19 | Stop reason: HOSPADM

## 2018-01-19 RX ORDER — EPHEDRINE SULFATE 50 MG/ML
5 INJECTION, SOLUTION INTRAVENOUS ONCE AS NEEDED
Status: DISCONTINUED | OUTPATIENT
Start: 2018-01-19 | End: 2018-01-19 | Stop reason: HOSPADM

## 2018-01-19 RX ORDER — HYDROCODONE BITARTRATE AND ACETAMINOPHEN 7.5; 325 MG/1; MG/1
1 TABLET ORAL ONCE AS NEEDED
Status: DISCONTINUED | OUTPATIENT
Start: 2018-01-19 | End: 2018-01-19 | Stop reason: HOSPADM

## 2018-01-19 RX ORDER — MIDAZOLAM HYDROCHLORIDE 1 MG/ML
2 INJECTION INTRAMUSCULAR; INTRAVENOUS
Status: DISCONTINUED | OUTPATIENT
Start: 2018-01-19 | End: 2018-01-19 | Stop reason: HOSPADM

## 2018-01-19 RX ORDER — PROPOFOL 10 MG/ML
VIAL (ML) INTRAVENOUS AS NEEDED
Status: DISCONTINUED | OUTPATIENT
Start: 2018-01-19 | End: 2018-01-19 | Stop reason: SURG

## 2018-01-19 RX ORDER — LIDOCAINE HYDROCHLORIDE 20 MG/ML
INJECTION, SOLUTION INFILTRATION; PERINEURAL AS NEEDED
Status: DISCONTINUED | OUTPATIENT
Start: 2018-01-19 | End: 2018-01-19 | Stop reason: SURG

## 2018-01-19 RX ADMIN — PROPOFOL 200 MG: 10 INJECTION, EMULSION INTRAVENOUS at 13:15

## 2018-01-19 RX ADMIN — FENTANYL CITRATE 50 MCG: 50 INJECTION INTRAMUSCULAR; INTRAVENOUS at 13:25

## 2018-01-19 RX ADMIN — ROPIVACAINE HYDROCHLORIDE 30 ML: 5 INJECTION, SOLUTION EPIDURAL; INFILTRATION; PERINEURAL at 12:00

## 2018-01-19 RX ADMIN — MIDAZOLAM 2 MG: 1 INJECTION INTRAMUSCULAR; INTRAVENOUS at 11:29

## 2018-01-19 RX ADMIN — DEXAMETHASONE SODIUM PHOSPHATE 8 MG: 10 INJECTION INTRAMUSCULAR; INTRAVENOUS at 13:59

## 2018-01-19 RX ADMIN — FAMOTIDINE 20 MG: 10 INJECTION, SOLUTION INTRAVENOUS at 11:28

## 2018-01-19 RX ADMIN — METHYLPREDNISOLONE SODIUM SUCCINATE 40 MG: 125 INJECTION, POWDER, FOR SOLUTION INTRAMUSCULAR; INTRAVENOUS at 12:00

## 2018-01-19 RX ADMIN — MIDAZOLAM 2 MG: 1 INJECTION INTRAMUSCULAR; INTRAVENOUS at 11:54

## 2018-01-19 RX ADMIN — FENTANYL CITRATE 50 MCG: 50 INJECTION INTRAMUSCULAR; INTRAVENOUS at 13:30

## 2018-01-19 RX ADMIN — HYDROCODONE BITARTRATE AND ACETAMINOPHEN 1 TABLET: 7.5; 325 TABLET ORAL at 15:11

## 2018-01-19 RX ADMIN — SODIUM CHLORIDE, POTASSIUM CHLORIDE, SODIUM LACTATE AND CALCIUM CHLORIDE 9 ML/HR: 600; 310; 30; 20 INJECTION, SOLUTION INTRAVENOUS at 11:29

## 2018-01-19 RX ADMIN — EPHEDRINE SULFATE 10 MG: 50 INJECTION INTRAMUSCULAR; INTRAVENOUS; SUBCUTANEOUS at 13:32

## 2018-01-19 RX ADMIN — CLINDAMYCIN PHOSPHATE 900 MG: 900 INJECTION INTRAVENOUS at 13:03

## 2018-01-19 RX ADMIN — ONDANSETRON 4 MG: 2 INJECTION INTRAMUSCULAR; INTRAVENOUS at 14:16

## 2018-01-19 RX ADMIN — LIDOCAINE HYDROCHLORIDE 8 ML: 10; .005 INJECTION, SOLUTION EPIDURAL; INFILTRATION; INTRACAUDAL; PERINEURAL at 12:00

## 2018-01-19 RX ADMIN — FENTANYL CITRATE 50 MCG: 50 INJECTION, SOLUTION INTRAMUSCULAR; INTRAVENOUS at 11:54

## 2018-01-19 RX ADMIN — PROPOFOL 50 MG: 10 INJECTION, EMULSION INTRAVENOUS at 13:17

## 2018-01-19 RX ADMIN — KETOROLAC TROMETHAMINE 30 MG: 30 INJECTION, SOLUTION INTRAMUSCULAR; INTRAVENOUS at 14:16

## 2018-01-19 RX ADMIN — LIDOCAINE HYDROCHLORIDE 60 MG: 20 INJECTION, SOLUTION INFILTRATION; PERINEURAL at 13:15

## 2018-01-19 NOTE — ANESTHESIA PREPROCEDURE EVALUATION
Anesthesia Evaluation     NPO Solid Status: > 8 hours  NPO Liquid Status: > 2 hours     Airway   Mallampati: II  TM distance: >3 FB  Neck ROM: full  no difficulty expected  Dental - normal exam     Pulmonary - normal exam   (+) asthma,   (-) not a smoker  Cardiovascular - normal exam    ECG reviewed    (+) hyperlipidemia      Neuro/Psych  GI/Hepatic/Renal/Endo    (+)  GERD,     Musculoskeletal     Abdominal    Substance History      OB/GYN          Other                                                Anesthesia Plan    ASA 2     general     intravenous induction   Anesthetic plan and risks discussed with patient.

## 2018-01-19 NOTE — ANESTHESIA POSTPROCEDURE EVALUATION
"Patient: April Feliz    Procedure Summary     Date Anesthesia Start Anesthesia Stop Room / Location    01/19/18 1303 1006  [facility] / [facility]       Procedure Diagnosis Surgeon Provider    ULNA/RADIUS OPEN REDUCTION INTERNAL FIXATION (Left Wrist) Other closed intra-articular fracture of distal end of left radius, initial encounter  (Other closed intra-articular fracture of distal end of left radius, initial encounter [S52.828Q]) MD Esthela Toledo MD          Anesthesia Type: general  Last vitals  BP   143/92 (01/19/18 1540)   Temp   36.7 °C (98 °F) (01/19/18 1515)   Pulse   64 (01/19/18 1540)   Resp   16 (01/19/18 1540)     SpO2   92 % (01/19/18 1540)     Post Anesthesia Care and Evaluation    Patient location during evaluation: PACU  Patient participation: complete - patient participated  Level of consciousness: awake and alert  Pain management: adequate  Airway patency: patent  Anesthetic complications: No anesthetic complications    Cardiovascular status: acceptable  Respiratory status: acceptable  Hydration status: acceptable    Comments: /92  Pulse 64  Temp 36.7 °C (98 °F) (Oral)   Resp 16  Ht 177.8 cm (70\")  Wt 71.2 kg (157 lb)  SpO2 92%  BMI 22.53 kg/m2        "

## 2018-01-19 NOTE — ANESTHESIA PROCEDURE NOTES
Peripheral Block    Patient location during procedure: holding area  Start time: 1/19/2018 11:54 AM  Stop time: 1/19/2018 12:03 PM  Reason for block: at surgeon's request and post-op pain management  Performed by  Anesthesiologist: DEV GARZA  Preanesthetic Checklist  Completed: patient identified, site marked, surgical consent, pre-op evaluation, timeout performed, IV checked, risks and benefits discussed and monitors and equipment checked  Prep:  Sterile barriers:gloves  Prep: ChloraPrep  Patient monitoring: continuous pulse oximetry, blood pressure monitoring and EKG  Procedure  Sedation:yes  Performed under: PNB  Guidance:ultrasound guided  ULTRASOUND INTERPRETATION. Using ultrasound guidance a gauge needle was placed in close proximity to the brachial plexus nerve, at which point, under ultrasound guidance anesthetic was injected in the area of the nerve and spread of the anesthesia was seen on ultrasound in close proximity thereto.  There were no abnormalities seen on ultrasound; a digital image was taken; and the patient tolerated the procedure with no complications. Images:still images obtained    Laterality:left  Block Type:supraclavicular  Needle Gauge:20 G    Medications  Depomedrol:40 mg  Comment:8cc of 1% lidocaine with epi  Local Injected:ropivacaine 0.5% and lidocaine 1% with epinephrine Local Amount Injected:30mL  Post Assessment  Injection Assessment: negative aspiration for heme, no paresthesia on injection and incremental injection  Patient Tolerance:comfortable throughout block  Complications:no

## 2018-01-19 NOTE — H&P (VIEW-ONLY)
LEFT distal radius fracture with displacement     History of Present Illness: 67 y.o. female  Who presented to [facility] emergency room following a history of fall, having slipped on ice.  As I Dr Rosenthal was on call for the emergency room and was consulted for further evaluation and treatment. I have seen the patient in the emergency department.  The patient is accompanied by her  to the hospital visit.  She denies any other injuries.  She noticed sudden onset of severe pain in the LEFT wrist associated with deformity.  She denies any tingling, numbness of the fingers.  Denies any history of loss of consciousness.  She works as a part-time  for middle school.  The patient has a history of osteopenia and is currently on Fosamax.     Patient denies any history of: DVT/PE, MRSA, COPD,  CHF, CAD, or Atrial fibrillation, Diabetes Mellitus.   Patient has a history of :  Patient is not taking anticoagulants.      Past medical history, Past surgical history, family history, Social history, current medications, home medications Have been reviewed by me.      Medical History         Past Medical History:   Diagnosis Date   • Abdominal hernia     • Asthma     • Hyperlipidemia     • Thyroid nodule            Surgical History          Past Surgical History:   Procedure Laterality Date   • BRONCHOSCOPY N/A 11/2/2017     Procedure: BRONCHOSCOPY WITH BAL and brushings;  Surgeon: David Castellanos MD;  Location: [facility] ENDOSCOPY;  Service:    • COLON RESECTION       • COLONOSCOPY       • GANGLION CYST EXCISION Right     • TUBAL ABDOMINAL LIGATION       • VENTRAL HERNIA REPAIR N/A 6/20/2017     Procedure: VENTRAL HERNIA REPAIR LAPAROSCOPIC WITH DAVINCI ROBOT;  Surgeon: Nayeli Goldstein MD;  Location: [facility] MAIN OR;  Service:          Social History           Occupational History   •                 Social History Main Topics   • Smoking status: Former Smoker       Packs/day: 0.50        Years: 10.00       Types: Cigarettes   • Smokeless tobacco: Never Used         Comment: 33YRS AGO   • Alcohol use Yes          Comment: weekly   • Drug use: No   • Sexual activity: Defer    Social History          Social History Narrative   • No narrative on file      Family History   Problem Relation Age of Onset   • Breast cancer Mother     • Colon cancer Maternal Grandmother     • Breast cancer Maternal Aunt     • Pancreatic cancer Father     • Lymphoma Sister     • Malig Hyperthermia Neg Hx     • Ovarian cancer Neg Hx     • Uterine cancer Neg Hx     • Deep vein thrombosis Neg Hx     • Pulmonary embolism Neg Hx                    Allergies   Allergen Reactions   • Penicillins Rash          Home Medications:     Prescriptions Prior to Admission       (Not in a hospital admission)        Current Medications:  Scheduled Meds:  Continuous Infusions:  No current facility-administered medications for this encounter.   PRN Meds:.     Review of Systems:   A 12 point system review was reviewed with the patient and from the chart  and is negative except as mentioned in history of present illness.       Physical Exam: 67 y.o. female         Vitals    Vitals:     01/14/18 1441 01/14/18 1442 01/14/18 1444 01/14/18 1536   BP: 134/91 134/91   146/92   BP Location: Right arm         Patient Position: Lying         Pulse: 76 71 68 78   Resp: 16   16 18   Temp:       98.6 °F (37 °C)   TempSrc:       Tympanic   SpO2: 99%   98% 94%   Weight:           Height:                     Gait: Not evaluated.      Mental/HEENT/cardio/skin: The patient's general appearance was well-nourished, well-hydrated, no acute distress.  Orientation was alert and oriented ×3.  The patient's mood was normal.   Pulmonary exam shows normal late exchange, no labored breathing, or shortness of breath.    The  skin exam showed normal temperature and color in the area of examination.     Extremities: LEFT wrist, positive deformity.  Positive tenderness.   Attempted movements of the wrist are painful and restricted.  She is able to move the fingers gently.  Gross sensation is intact over the fingers.  Good capillary refill. No sign of carpal tunnel syndrome.     Pulses: Unable to palpate the radial pulse, secondary to swelling and tenderness.     Diagnostic Tests:                 No results found for: CRYSTAL]  No results found for: CULTURE]  No results found for: URICACID]  )Xr Wrist 2 View Left     Result Date: 1/14/2018  Narrative: EMERGENCY IMAGING LEFT WRIST 2 VIEWS  HISTORY: Post closed reduction distal radial fracture  COMPARISON: Prereduction imaging of earlier today  FINDINGS: 1. Mild interval improvement in alignment with details obscured by overlying splint.  This report was finalized on 1/14/2018 3:22 PM by Dr. Elías Davison MD.       Xr Wrist 3+ View Left     Result Date: 1/14/2018  Narrative: EMERGENCY IMAGING LEFT WRIST 4 VIEWS LEFT HAND 3 VIEWS  HISTORY: 67-year-old female who fell on outstretched hand last night  COMPARISON: None available  FINDINGS: 1. Comminuted fracture distal radius with impaction and angulation. 2. The styloid process of the ulna appears intact. 3. 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 4. No other evidence of acute process.  This report was finalized on 1/14/2018 12:07 PM by Dr. Elías Davison MD.       Xr Hand 3+ View Left     Result Date: 1/14/2018  Narrative: EMERGENCY IMAGING LEFT WRIST 4 VIEWS LEFT HAND 3 VIEWS  HISTORY: 67-year-old female who fell on outstretched hand last night  COMPARISON: None available  FINDINGS: 1. Comminuted fracture distal radius with impaction and angulation. 2. The styloid process of the ulna appears intact. 3. 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 4. No other evidence of acute process.  This report was finalized on 1/14/2018 12:07 PM by Dr. Elías Davison MD.          Assessment: Comminuted LEFT wrist distal radius fracture with impaction.        Plan:  Options and alternatives were discussed in detail with the patient and  Her . The patient has significant loss of radial height inclination and loss of volar tilt associated with osteopenia. The patient is indicated for Closed reduction and cast placement versus open reduction internal fixation.      The patient voiced understanding of the risks, benefits, and alternative forms of treatment that were discussed including but not limited to  Infection,  malunion, nonunion, Posttraumatic stiffness, posttraumatic arthritis, medical risks including stroke, heart attack have been discussed. Despite the risks involved the patient and Her  consent to proceed with Open reduction and internal fixation of the LEFT wrist distal radius fracture.      The patient   will be scheduled for the above surgery tentatively for This week .

## 2018-01-19 NOTE — PLAN OF CARE
Problem: Patient Care Overview (Adult)  Goal: Plan of Care Review  Outcome: Outcome(s) achieved Date Met: 01/19/18 01/19/18 1619   Coping/Psychosocial Response Interventions   Plan Of Care Reviewed With patient;spouse   Patient Care Overview   Progress improving   Outcome Evaluation   Outcome Summary/Follow up Plan ready for discharge     Goal: Adult Individualization and Mutuality  Outcome: Outcome(s) achieved Date Met: 01/19/18    Goal: Discharge Needs Assessment  Outcome: Outcome(s) achieved Date Met: 01/19/18      Problem: Perioperative Period (Adult)  Goal: Signs and Symptoms of Listed Potential Problems Will be Absent or Manageable (Perioperative Period)  Outcome: Outcome(s) achieved Date Met: 01/19/18 01/19/18 1619   Perioperative Period   Problems Assessed (Perioperative Period) pain;wound complications;hemorrhage;hypoxia/hypoxemia;perioperative injury;situational response;physiologic stress response   Problems Present (Perioperative Period) none

## 2018-01-19 NOTE — ANESTHESIA PROCEDURE NOTES
Airway  Urgency: elective    Date/Time: 1/19/2018 1:16 PM    General Information and Staff    Patient location during procedure: OR  Anesthesiologist: DEV GARZA  CRNA: CARSON VALENZUELA    Indications and Patient Condition    Preoxygenated: yes  Mask difficulty assessment: 0 - not attempted    Final Airway Details  Final airway type: supraglottic airway      Successful airway: classic  Size 4    Number of attempts at approach: 1    Additional Comments  Pt to OR and positioned self to OR table. Monitors applied. PreO2: SIVI and easy insertion LMA. ETCO2 +, Equal and bilateral chest rise

## 2018-01-19 NOTE — PLAN OF CARE
Problem: Patient Care Overview (Adult)  Goal: Plan of Care Review  Outcome: Ongoing (interventions implemented as appropriate)   01/19/18 1037   Coping/Psychosocial Response Interventions   Plan Of Care Reviewed With patient   Patient Care Overview   Progress no change     Goal: Adult Individualization and Mutuality  Outcome: Ongoing (interventions implemented as appropriate)    Goal: Discharge Needs Assessment  Outcome: Ongoing (interventions implemented as appropriate)   01/19/18 1037   Discharge Needs Assessment   Concerns To Be Addressed no discharge needs identified;denies needs/concerns at this time   Concerns Comments  WILL ASSIST AT HOME TODAY AFTER DISCHARGE        Problem: Perioperative Period (Adult)  Goal: Signs and Symptoms of Listed Potential Problems Will be Absent or Manageable (Perioperative Period)  Outcome: Ongoing (interventions implemented as appropriate)   01/19/18 1037   Perioperative Period   Problems Assessed (Perioperative Period) pain   Problems Present (Perioperative Period) pain

## 2018-01-19 NOTE — OP NOTE
Operative Note    Name: April Feliz    YOB: 1950  Medical Record Number: [id number]  Attending Physician: Bhanu Rosenthal, *  Primary Care Physician: СВЕТЛАНА Bettencourt    Date of Service: 1/19/2018     PREOPERATIVE DIAGNOSIS: Comminuted wrist distal radius fracture with displacement.    POSTOPERATIVE DIAGNOSIS: Comminuted wrist distal radius fracture with displacement.    PROCEDURE PERFORMED: LEFT DISTAL  RADIUS OPEN REDUCTION INTERNAL FIXATION wrist distal radius fracture utilizing a volar anatomically contoured locking plate (ARTHREX  titanium).   IMPLANTS   Volar locking plate - 1  3.5 Cortical screw - 1  3.5 Locking screw - 2  2.4 Locking screw - 4  2.4 Locking pegs   - 1    ANESTHESIA: Regional block.   ESTIMATED BLOOD LOSS: Less than 10 mL.   SPECIMENS: Nil.   COMPLICATIONS: Nil.   DRAINS: Nil.   SURGEON: Bhanu Rosenthal M.D.  ASSISTANTS: MATT Brewer  The services of a first assist were necessary for performing the procedure safely and expeditiously.  The first assist was present for the entire duration of the case and helped with positioning, retraction and closure of the incision.    INDICATIONS: The patient is a 67 y.o. female who is a right hand dominant individual who presented to [facility] emergency room and was then referred to my office for further evaluation and management. The patient was evaluated in the office. X-rays confirmed presence of a displaced distal radius fracture with intraarticular extension with comminution, loss of radial height inclination and loss of volar tilt.   The patient was indicated for an open reduction and internal fixation of the  wrist distal radius fracture and/or a closed reduction and cast placement. Options were discussed. The patient elected to proceed with an open reduction internal fixation. Likely risks and benefits of the procedure including, but not limited to infection, malunion, nonunion,  posttraumatic stiffness, posttraumatic arthritis, possibility of injury to nerves, vessels or tendons have been discussed in detail. Despite the risks involved, the patient elected to proceed and informed consent was obtained and was scheduled for surgery. The patient was seen in the preoperative holding area and the operative site was marked.    PROCEDURE: The patient has been transferred to [facility] operating room. Preoperative antibiotics   Kefzol  were given intravenously. After achieving adequate regional and analgesia, a well-padded tourniquet was placed over the proximal aspect of the operative arm. The wrist was prepped and draped in the usual sterile fashion. Surgical timeout was done. Correct patient surgical site and site were identified. Tourniquet was elevated to a pressure of 250 mmHg. A skin incision was made on the volar aspect of the wrist for the flexor carpi radialis approach (FCR). The skin and subcutaneous tissue were incised and the FCR tendon was identified. The tendon sheath was incised and the volar aspect tendon was retracted ulnarly and I incised the posterior tendon sheath of the flexor carpi radialis. Pronator quadratus muscle was identified. The muscle was released from the radial insertion and it was elevated from the distal radius. There was comminution, but I was able to line up the volar cortex very satisfactorily. The radial styloid fragment was separate. Again, there was severe osteopenia. I reduced the fracture by manipulation with traction and then gently the volar tilt was corrected. At this stage, the anatomically contoured volar locking plate was selected to span the fracture site. The plate was seated and it was found to be seated satisfactorily. The position was checked after temporary fixation with a K wire. It was found to be in good position. I placed a single cortical screw in the oblong hole and followed by this, a locking screw was placed in the  distal metaphyseal portion of the plate. The position of the plate, the reduction of the fracture, and the restoration of the fracture alignment was found to be very satisfactory. I later proceeded with filling further locking screws, 2 in the shaft portion of the plate, and a total of 5 more screws in the metaphyseal portion, including 2 of them going into the radial styloid. The reduction of the fracture, fixation of the fracture, and position of the hardware was found to be satisfactory and stable. The sponge count and needle count was found to be correct. Incision was thoroughly irrigated with saline and the pronator quadratus was reattached to its insertion and I closed the incision in layers with a subcuticular Monocryl suture. Steri-Strips were applied.     Sterile dressings were placed and a well-padded dorsal splint was placed. The patient tolerated the procedure well. Good distal pulses and good capillary refill was confirmed. I have given the patient a shoulder sling for comfort. In addition, the patient is being discharged home with instructions to keep the operative extremity elevated, nonweightbearing on the operative extremity, encourage active finger movements and to keep the splint dry and intact. Elevate fingers above heart level. Encouraged to move fingers, elbow and shoulders once the block wears off.     The patient is given a prescription for  Norco 5/325, 1 p.o. q.4 h. p.r.n. moderate or severe pain.   The patient is to follow up with me in the office in approximately 10 days  and will call the office at [phone] for appointment.     I discussed the satisfactory performance of the procedure with the patient's family and discussed with them The postoperative management.    Bhanu Rosenthal M.D.*    1/19/2018      2:24 PM    CC:СВЕТЛАНА Bettencourt; MD Bhanu Darden, *

## 2018-01-19 NOTE — ADDENDUM NOTE
Addendum  created 01/19/18 1730 by Salomon Franks MD    Anesthesia Review and Sign - Ready for Procedure

## 2018-03-05 ENCOUNTER — HOSPITAL ENCOUNTER (OUTPATIENT)
Dept: CT IMAGING | Facility: HOSPITAL | Age: 68
Discharge: HOME | End: 2018-03-05
Attending: INTERNAL MEDICINE | Admitting: INTERNAL MEDICINE

## 2018-03-05 DIAGNOSIS — J84.9 ILD (INTERSTITIAL LUNG DISEASE) (HCC): ICD-10-CM

## 2018-03-05 DIAGNOSIS — R91.1 PULMONARY NODULE: ICD-10-CM

## 2018-03-05 PROCEDURE — 71250 CT THORAX DX C-: CPT

## 2018-08-21 ENCOUNTER — APPOINTMENT (OUTPATIENT)
Dept: WOMENS IMAGING | Facility: HOSPITAL | Age: 68
End: 2018-08-21

## 2018-08-21 ENCOUNTER — PROCEDURE VISIT (OUTPATIENT)
Dept: OBSTETRICS AND GYNECOLOGY | Facility: CLINIC | Age: 68
End: 2018-08-21

## 2018-08-21 ENCOUNTER — OFFICE VISIT (OUTPATIENT)
Dept: OBSTETRICS AND GYNECOLOGY | Facility: CLINIC | Age: 68
End: 2018-08-21

## 2018-08-21 VITALS
HEART RATE: 70 BPM | SYSTOLIC BLOOD PRESSURE: 141 MMHG | BODY MASS INDEX: 22.48 KG/M2 | DIASTOLIC BLOOD PRESSURE: 96 MMHG | HEIGHT: 70 IN | WEIGHT: 157 LBS

## 2018-08-21 DIAGNOSIS — M85.89 OSTEOPENIA OF MULTIPLE SITES: Primary | ICD-10-CM

## 2018-08-21 DIAGNOSIS — Z12.31 VISIT FOR SCREENING MAMMOGRAM: Primary | ICD-10-CM

## 2018-08-21 PROCEDURE — 77067 SCR MAMMO BI INCL CAD: CPT | Performed by: RADIOLOGY

## 2018-08-21 PROCEDURE — 77067 SCR MAMMO BI INCL CAD: CPT | Performed by: OBSTETRICS & GYNECOLOGY

## 2018-08-21 PROCEDURE — 99213 OFFICE O/P EST LOW 20 MIN: CPT | Performed by: OBSTETRICS & GYNECOLOGY

## 2018-08-21 RX ORDER — ALENDRONATE SODIUM 70 MG/1
70 TABLET ORAL
Qty: 12 TABLET | Refills: 3 | Status: SHIPPED | OUTPATIENT
Start: 2018-08-21 | End: 2019-09-03 | Stop reason: SDUPTHER

## 2018-08-21 NOTE — PROGRESS NOTES
"Here for follow up of Medication.     HPI    April Feliz is a 68 y.o. patient who presents for medication follow up. Pt is using Fosomax for Osteopenia. Last Dexa: 2014.    68 y.o.    Longstanding patient who we have been working with for years due to some osteopenia. It has been a year since her last visit and other than a wrist fracture and repair is doing well overall. Not note of osteoporosis in x ray report.       Review of Systems   Constitutional: Negative for chills and fever.   Gastrointestinal: Negative for abdominal pain.   Genitourinary: Negative for dysuria, pelvic pain, vaginal bleeding and vaginal discharge.   All other systems reviewed and are negative.      /96   Pulse 70   Ht 177.8 cm (70\")   Wt 71.2 kg (157 lb)   Breastfeeding? No   BMI 22.53 kg/m²     Physical Exam   Constitutional: She is oriented to person, place, and time. She appears well-developed and well-nourished. No distress.   Musculoskeletal: Normal range of motion. She exhibits no edema, tenderness or deformity.   Neurological: She is alert and oriented to person, place, and time.   Skin: Skin is warm and dry. No erythema.   Psychiatric: She has a normal mood and affect. Her behavior is normal.         Assessment/Plan   Problems Addressed this Visit     None      Visit Diagnoses     Osteopenia of multiple sites    -  Primary        - copy of DEXA from ,   Repeat DEXA with next visit (re-run Frax)   Continue medication for now.   No side effects or concerns          Jerel Lincoln MD   2018  9:15 AM  "

## 2019-04-03 NOTE — DISCHARGE INSTRUCTIONS
DO NOT EAT OR DRINK UNTIL AFTER 11:30 A.M. TODAY    CALL DR. GOMES -[phone] FOR ANY QUESTIONS OR CONCERNS   English

## 2019-07-01 ENCOUNTER — OUTSIDE FACILITY SERVICE (OUTPATIENT)
Dept: SURGERY | Facility: CLINIC | Age: 69
End: 2019-07-01

## 2019-07-01 PROCEDURE — 45378 DIAGNOSTIC COLONOSCOPY: CPT | Performed by: SURGERY

## 2019-08-27 ENCOUNTER — OFFICE VISIT (OUTPATIENT)
Dept: OBSTETRICS AND GYNECOLOGY | Facility: CLINIC | Age: 69
End: 2019-08-27

## 2019-08-27 ENCOUNTER — PROCEDURE VISIT (OUTPATIENT)
Dept: OBSTETRICS AND GYNECOLOGY | Facility: CLINIC | Age: 69
End: 2019-08-27

## 2019-08-27 VITALS
HEART RATE: 92 BPM | SYSTOLIC BLOOD PRESSURE: 128 MMHG | DIASTOLIC BLOOD PRESSURE: 91 MMHG | BODY MASS INDEX: 22.62 KG/M2 | WEIGHT: 158 LBS | HEIGHT: 70 IN

## 2019-08-27 DIAGNOSIS — Z87.81 HISTORY OF FRACTURE: ICD-10-CM

## 2019-08-27 DIAGNOSIS — Z01.419 ENCOUNTER FOR GYNECOLOGICAL EXAMINATION WITHOUT ABNORMAL FINDING: Primary | ICD-10-CM

## 2019-08-27 DIAGNOSIS — Z78.0 POST-MENOPAUSAL: ICD-10-CM

## 2019-08-27 DIAGNOSIS — Z82.62 FAMILY HISTORY OF OSTEOPOROSIS: ICD-10-CM

## 2019-08-27 DIAGNOSIS — Z51.81 ENCOUNTER FOR MONITORING DENOSUMAB THERAPY: Primary | ICD-10-CM

## 2019-08-27 DIAGNOSIS — Z79.899 ENCOUNTER FOR MONITORING DENOSUMAB THERAPY: Primary | ICD-10-CM

## 2019-08-27 PROCEDURE — 77080 DXA BONE DENSITY AXIAL: CPT | Performed by: OBSTETRICS & GYNECOLOGY

## 2019-08-27 PROCEDURE — 99397 PER PM REEVAL EST PAT 65+ YR: CPT | Performed by: OBSTETRICS & GYNECOLOGY

## 2019-08-27 RX ORDER — ATORVASTATIN CALCIUM 20 MG/1
20 TABLET, FILM COATED ORAL NIGHTLY
COMMUNITY
Start: 2019-06-26 | End: 2020-06-26

## 2019-08-27 RX ORDER — ALBUTEROL SULFATE 90 UG/1
2 AEROSOL, METERED RESPIRATORY (INHALATION) AS NEEDED
COMMUNITY

## 2019-08-27 RX ORDER — OMEPRAZOLE 20 MG/1
20 CAPSULE, DELAYED RELEASE ORAL DAILY
COMMUNITY
Start: 2018-06-25 | End: 2021-10-05

## 2019-08-27 NOTE — PROGRESS NOTES
GYN Annual Exam     CC- Here for annual exam.     April Feliz is a 69 y.o. female who presents for annual well woman exam. Periods are absent due to Menopause.     OB History      Para Term  AB Living    2 2 2     2    SAB TAB Ectopic Molar Multiple Live Births                         Current contraception: post menopausal status  History of abnormal Pap smear: yes - Cryo in the past  Family history of uterine, colon or ovarian cancer: yes - colon cancer Maternal grandmother   History of abnormal mammogram: no  Family history of breast cancer: yes - Maternal aunt and Mother   Last Pap : 2016 NL HPV neg  Last mammogram: 2018  Last colonoscopy: 2019  Last DEXA: 07/10/2014     Past Medical History:   Diagnosis Date   • Abdominal hernia    • Acid reflux    • Asthma    • Hyperlipidemia    • S/P wrist surgery    • Seasonal allergies    • Thyroid nodule        Past Surgical History:   Procedure Laterality Date   • BRONCHOSCOPY N/A 2017    Procedure: BRONCHOSCOPY WITH BAL and brushings;  Surgeon: David Castellanos MD;  Location: [facility] ENDOSCOPY;  Service:    • COLON RESECTION     • COLONOSCOPY     • GANGLION CYST EXCISION Right    • ORIF WRIST FRACTURE Left 2018    Procedure: ULNA/RADIUS OPEN REDUCTION INTERNAL FIXATION;  Surgeon: Bhanu Rosenthal MD;  Location: [facility] OR;  Service:    • TUBAL ABDOMINAL LIGATION     • VENTRAL HERNIA REPAIR N/A 2017    Procedure: VENTRAL HERNIA REPAIR LAPAROSCOPIC WITH DAVINCI ROBOT;  Surgeon: Nayeli Goldstein MD;  Location: [facility] OR;  Service:          Current Outpatient Medications:   •  atorvastatin (LIPITOR) 20 MG tablet, Take 20 mg by mouth Daily., Disp: , Rfl:   •  omeprazole (priLOSEC) 20 MG capsule, Take 20 mg by mouth Daily., Disp: , Rfl:   •  albuterol sulfate  (90 Base) MCG/ACT inhaler, Inhale 2 puffs., Disp: , Rfl:   •  alendronate (FOSAMAX) 70 MG tablet, Take 1 tablet by mouth Every 7 (Seven) Days.  "SATURDAYS, Disp: 12 tablet, Rfl: 3  •  Calcium Citrate-Vitamin D (CALCIUM + D PO), Take 1 tablet by mouth Daily., Disp: , Rfl:   •  cetirizine (zyrTEC) 10 MG tablet, Take 10 mg by mouth Daily., Disp: , Rfl:   •  fluticasone-salmeterol (ADVAIR) 100-50 MCG/DOSE DISKUS, Inhale 1 puff 2 (Two) Times a Day., Disp: , Rfl:   •  mometasone (NASONEX) 50 MCG/ACT nasal spray, 2 sprays into each nostril Daily As Needed., Disp: , Rfl:   •  montelukast (SINGULAIR) 10 MG tablet, Take 10 mg by mouth Every Night., Disp: , Rfl:   •  Multiple Vitamins-Minerals (MULTIVITAMIN ADULT PO), Take 1 tablet by mouth Daily., Disp: , Rfl:   •  TURMERIC PO, Take  by mouth., Disp: , Rfl:     Allergies   Allergen Reactions   • Penicillins Rash       Social History     Tobacco Use   • Smoking status: Former Smoker     Packs/day: 0.50     Years: 10.00     Pack years: 5.00     Types: Cigarettes   • Smokeless tobacco: Never Used   • Tobacco comment: 33YRS AGO   Substance Use Topics   • Alcohol use: Yes     Comment: weekly   • Drug use: No       Family History   Problem Relation Age of Onset   • Breast cancer Mother    • Colon cancer Maternal Grandmother    • Breast cancer Maternal Aunt    • Pancreatic cancer Father    • Lymphoma Sister    • Malig Hyperthermia Neg Hx    • Ovarian cancer Neg Hx    • Uterine cancer Neg Hx    • Deep vein thrombosis Neg Hx    • Pulmonary embolism Neg Hx        Review of Systems   Constitutional: Negative for chills and fever.   Gastrointestinal: Negative for abdominal pain.   Genitourinary: Negative for dysuria, pelvic pain, vaginal bleeding and vaginal discharge.   All other systems reviewed and are negative.      /91   Pulse 92   Ht 177.8 cm (70\")   Wt 71.7 kg (158 lb)   Breastfeeding? No   BMI 22.67 kg/m²     Physical Exam   Constitutional: She is oriented to person, place, and time. She appears well-developed and well-nourished. No distress. She is not obese.  HENT:   Head: Normocephalic and atraumatic. "   Eyes: Conjunctivae are normal. Right eye exhibits no discharge. Left eye exhibits no discharge.   Neck: Normal range of motion. Neck supple. No thyromegaly present.   Cardiovascular: Normal rate, regular rhythm and normal heart sounds.   No murmur heard.  Pulmonary/Chest: Effort normal and breath sounds normal. No respiratory distress. Right breast exhibits no inverted nipple, no mass and no nipple discharge. Left breast exhibits no inverted nipple, no mass and no nipple discharge.   Abdominal: Soft. Bowel sounds are normal. She exhibits no distension. There is no tenderness.   Genitourinary: Cervix normal. Pelvic exam was performed with patient supine. There is no lesion or Bartholin's cyst on the right labia. There is no lesion or Bartholin's cyst on the left labia. Uterus is anteverted. Uterus is not deviated, enlarged, fixed or exhibiting a mass. Cervix does not exhibit motion tenderness or friability. Right adnexum is non-palpable.Left adnexum is non-palpable.Vagina exhibits loss of rugae. No bleeding in the vagina. No vaginal discharge found.   Musculoskeletal: Normal range of motion. She exhibits no edema.   Lymphadenopathy:     She has no cervical adenopathy.        Right: No inguinal adenopathy present.        Left: No inguinal adenopathy present.   Neurological: She is alert and oriented to person, place, and time.   Skin: Skin is warm and dry. No rash noted.   Psychiatric: She has a normal mood and affect. Her behavior is normal. Judgment and thought content normal.          Assessment     1) GYN annual well woman exam.        Plan     1) Breast Health - Clinical breast exam & mammogram yearly, Self breast awareness monthly  2) Pap - updated today   3) Smoking status- non-smoker   4) Colon health - screening colonoscopy recommended if not up to date  5) Bone health - Weight bearing exercise, dietary calcium recommendations and vitamin D reviewed.   6) Activity recommends - Adult 150-300 min/week of  multi-component physical activities that include balance training, aerobic and physical strengthening.  Disabled or ill adults should still try to fulfill these requirements, with modifications based on their conditions.   7) Follow up prn and one year      Jerel Lincoln MD   8/27/2019  1:32 PM

## 2019-08-29 LAB
CYTOLOGIST CVX/VAG CYTO: NORMAL
CYTOLOGY CVX/VAG DOC CYTO: NORMAL
CYTOLOGY CVX/VAG DOC THIN PREP: NORMAL
DX ICD CODE: NORMAL
HIV 1 & 2 AB SER-IMP: NORMAL
HPV I/H RISK 1 DNA CVX QL PROBE+SIG AMP: NEGATIVE
OTHER STN SPEC: NORMAL
STAT OF ADQ CVX/VAG CYTO-IMP: NORMAL

## 2019-09-03 ENCOUNTER — TELEPHONE (OUTPATIENT)
Dept: OBSTETRICS AND GYNECOLOGY | Facility: CLINIC | Age: 69
End: 2019-09-03

## 2019-09-03 RX ORDER — ALENDRONATE SODIUM 70 MG/1
70 TABLET ORAL
Qty: 12 TABLET | Refills: 3 | Status: SHIPPED | OUTPATIENT
Start: 2019-09-03 | End: 2020-10-19 | Stop reason: SDUPTHER

## 2019-09-03 NOTE — TELEPHONE ENCOUNTER
Linda,     That has been done.     Thanks   Dr. Latonia Lincoln,    Can you send in a refill x 1 year for her Fosamax please.     ThanksLinda

## 2019-09-03 NOTE — TELEPHONE ENCOUNTER
Linda,     Dexa done and compared to 2014     Appears stable (not worse)   Still in osteopenic range for both hips.   FRAX still 21% for 10 year osteoporotic fracture (was 25%) and 5.3% for hip fracture (was 6.3%)    Would keep doing what she is as she has not lost any significant bone at this time.      Please let her know.     Thanks   Dr. Lincoln

## 2019-11-03 ENCOUNTER — HOSPITAL ENCOUNTER (EMERGENCY)
Facility: HOSPITAL | Age: 69
Discharge: HOME | End: 2019-11-03
Attending: EMERGENCY MEDICINE | Admitting: EMERGENCY MEDICINE

## 2019-11-03 ENCOUNTER — APPOINTMENT (OUTPATIENT)
Dept: CT IMAGING | Facility: HOSPITAL | Age: 69
End: 2019-11-03

## 2019-11-03 VITALS
HEART RATE: 104 BPM | WEIGHT: 163.2 LBS | SYSTOLIC BLOOD PRESSURE: 151 MMHG | DIASTOLIC BLOOD PRESSURE: 98 MMHG | TEMPERATURE: 96.8 F | BODY MASS INDEX: 23.37 KG/M2 | OXYGEN SATURATION: 98 % | HEIGHT: 70 IN | RESPIRATION RATE: 16 BRPM

## 2019-11-03 DIAGNOSIS — S02.2XXB OPEN FRACTURE OF NASAL BONE, INITIAL ENCOUNTER: Primary | ICD-10-CM

## 2019-11-03 DIAGNOSIS — S01.81XA FACIAL LACERATION, INITIAL ENCOUNTER: ICD-10-CM
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
## 2019-11-03 PROCEDURE — 90471 IMMUNIZATION ADMIN: CPT | Performed by: NURSE PRACTITIONER

## 2019-11-03 PROCEDURE — 25010000002 TDAP 5-2.5-18.5 LF-MCG/0.5 SUSPENSION: Performed by: NURSE PRACTITIONER

## 2019-11-03 PROCEDURE — 70450 CT HEAD/BRAIN W/O DYE: CPT

## 2019-11-03 PROCEDURE — 99283 EMERGENCY DEPT VISIT LOW MDM: CPT

## 2019-11-03 PROCEDURE — 90715 TDAP VACCINE 7 YRS/> IM: CPT | Performed by: NURSE PRACTITIONER

## 2019-11-03 PROCEDURE — 70486 CT MAXILLOFACIAL W/O DYE: CPT

## 2019-11-03 PROCEDURE — 25010000003 LIDOCAINE 1 % SOLUTION

## 2019-11-03 RX ORDER — CEPHALEXIN 500 MG/1
500 CAPSULE ORAL 3 TIMES DAILY
Qty: 30 CAPSULE | Refills: 0 | Status: SHIPPED | OUTPATIENT
Start: 2019-11-03 | End: 2020-11-10

## 2019-11-03 RX ORDER — LIDOCAINE HYDROCHLORIDE AND EPINEPHRINE 10; 10 MG/ML; UG/ML
10 INJECTION, SOLUTION INFILTRATION; PERINEURAL ONCE
Status: DISCONTINUED | OUTPATIENT
Start: 2019-11-03 | End: 2019-11-03 | Stop reason: HOSPADM

## 2019-11-03 RX ADMIN — TETANUS TOXOID, REDUCED DIPHTHERIA TOXOID AND ACELLULAR PERTUSSIS VACCINE, ADSORBED 0.5 ML: 5; 2.5; 8; 8; 2.5 SUSPENSION INTRAMUSCULAR at 09:30

## 2019-11-03 NOTE — ED PROVIDER NOTES
EMERGENCY DEPARTMENT ENCOUNTER    Room Number:  17/17  Date seen:  11/3/2019  Time seen: 7:40 AM  PCP: Petra Webber APRN    HPI:  Chief complaint: Facial laceration  Context:April Feliz is a 69 y.o. female who presents to the ED with c/o a facial laceration that occurred after a fall last night around 2200. Pt denies neck pain, back pain, BLE pain, BUE pain, nausea, vomiting, dizziness, HA, SOA, CP and LOC. The pt states she was walking around in her garage, when she tripped over lumber, causing her to fall and hit her face. She noticed a laceration to the bridge of her nose and L forehead, however was not concerned. Upon waking this morning, she noticed the severity of the laceration, so she decided to come to the ED for further evaluation. TDAP is not UTD.    Onset: sudden  Location:face  Radiation: none stated  Duration: 2200 last night  Timing: constant  Character:laceration  Aggravating Factors: none stated  Alleviating Factors: none stated  Severity: moderate    ALLERGIES  Penicillins    PAST MEDICAL HISTORY  Active Ambulatory Problems     Diagnosis Date Noted   • Closed fracture of left distal radius 01/16/2018     Resolved Ambulatory Problems     Diagnosis Date Noted   • No Resolved Ambulatory Problems     Past Medical History:   Diagnosis Date   • Abdominal hernia    • Acid reflux    • Asthma    • Hyperlipidemia    • S/P wrist surgery    • Seasonal allergies    • Thyroid nodule        PAST SURGICAL HISTORY  Past Surgical History:   Procedure Laterality Date   • BRONCHOSCOPY N/A 11/2/2017    Procedure: BRONCHOSCOPY WITH BAL and brushings;  Surgeon: David Castellanos MD;  Location: [facility] ENDOSCOPY;  Service:    • COLON RESECTION     • COLONOSCOPY     • GANGLION CYST EXCISION Right    • ORIF WRIST FRACTURE Left 1/19/2018    Procedure: ULNA/RADIUS OPEN REDUCTION INTERNAL FIXATION;  Surgeon: Bhanu Rosenthal MD;  Location: [facility] OR;  Service:    • TUBAL ABDOMINAL LIGATION     • VENTRAL  HERNIA REPAIR N/A 6/20/2017    Procedure: VENTRAL HERNIA REPAIR LAPAROSCOPIC WITH DAVINCI ROBOT;  Surgeon: Nayeli Goldstein MD;  Location: [facility] OR;  Service:        FAMILY HISTORY  Family History   Problem Relation Age of Onset   • Breast cancer Mother    • Colon cancer Maternal Grandmother    • Breast cancer Maternal Aunt    • Pancreatic cancer Father    • Lymphoma Sister    • Malig Hyperthermia Neg Hx    • Ovarian cancer Neg Hx    • Uterine cancer Neg Hx    • Deep vein thrombosis Neg Hx    • Pulmonary embolism Neg Hx        SOCIAL HISTORY  Social History     Socioeconomic History   • Marital status:      Spouse name: Not on file   • Number of children: Not on file   • Years of education: Not on file   • Highest education level: Not on file   Occupational History   • Occupation:    Tobacco Use   • Smoking status: Former Smoker     Packs/day: 0.50     Years: 10.00     Pack years: 5.00     Types: Cigarettes   • Smokeless tobacco: Never Used   • Tobacco comment: 33YRS AGO   Substance and Sexual Activity   • Alcohol use: Yes     Comment: weekly   • Drug use: No   • Sexual activity: Yes     Partners: Male       REVIEW OF SYSTEMS  Review of Systems   Constitutional: Negative.  Negative for chills and fever.   HENT: Negative.    Eyes: Negative.    Respiratory: Negative for shortness of breath.    Cardiovascular: Negative for chest pain.   Gastrointestinal: Negative for abdominal pain, nausea and vomiting.   Genitourinary: Negative.  Negative for dysuria and hematuria.   Musculoskeletal: Negative.  Negative for arthralgias, back pain and neck pain.   Skin: Positive for wound (facial laceration). Negative for rash.   Neurological: Negative.  Negative for dizziness, syncope and headaches.        (-) LOC   Psychiatric/Behavioral: Negative.  Negative for confusion.       PHYSICAL EXAM  ED Triage Vitals   Temp Heart Rate Resp BP SpO2   11/03/19 0658 11/03/19 0658 11/03/19 0658 11/03/19 0718 11/03/19  0658   96.8 °F (36 °C) 116 18 145/93 100 %      Temp src Heart Rate Source Patient Position BP Location FiO2 (%)   11/03/19 0658 11/03/19 0658 -- -- --   Tympanic Monitor        Physical Exam   Constitutional: She is oriented to person, place, and time and well-developed, well-nourished, and in no distress. No distress.   HENT:   Head: Normocephalic. Head is with laceration (1 cm to the L-side of forehead).   Nose: Nose lacerations (2 cm horizontal curvilinear laceration to the nasal bridge. no active bleeding.) present.   Mouth/Throat: Mucous membranes are normal.   Small 0.5 superficial laceration to the upper lip, does not cross the vermillion border, not gaping, no active bleeding.   Eyes: Pupils are equal, round, and reactive to light.   Neck: Normal range of motion. Neck supple.   Cardiovascular: Normal rate, regular rhythm and normal heart sounds.   Pulmonary/Chest: Effort normal and breath sounds normal. No respiratory distress.   Abdominal: Soft. There is no tenderness. There is no rebound and no guarding.   Musculoskeletal:   No CTLS midline spine tenderness   Neurological: She is alert and oriented to person, place, and time. She has normal strength and intact cranial nerves.   Skin: Skin is warm, dry and intact.   Psychiatric: Mood, affect and judgment normal.   Nursing note and vitals reviewed.      RADIOLOGY  CT Head Without Contrast   There is minimal diffuse atrophy. There is no evidence of  intracranial hemorrhage or mass effect. There is a scalp hematoma in the  left frontal region and associated with a minimally displaced fracture  of the left nasal bone as best seen on the thin section facial CT scan.  No other facial fractures are seen and the sinuses are clear.      CT Facial Bones Without Contrast   There is minimal diffuse atrophy. There is no evidence of  intracranial hemorrhage or mass effect. There is a scalp hematoma in the  left frontal region and associated with a minimally displaced  fracture  of the left nasal bone as best seen on the thin section facial CT scan.  No other facial fractures are seen and the sinuses are clear.          I ordered the above noted radiological studies and reviewed the images on the PACS system.  Spoke with Dr. Ruano regarding CT/MRI scan results    MEDICATIONS GIVEN IN ER  Medications   lidocaine-EPINEPHrine (XYLOCAINE W/EPI) 1 %-1:816695 injection 10 mL (not administered)   Tdap (BOOSTRIX) injection 0.5 mL (0.5 mL Intramuscular Given 11/3/19 0930)     PROCEDURES  Laceration Repair  Date/Time: 11/3/2019 9:32 AM  Performed by: Darcie Rolle APRN  Authorized by: David Wang MD     Consent:     Consent obtained:  Verbal    Consent given by:  Patient    Risks discussed:  Infection, pain, poor cosmetic result, poor wound healing and need for additional repair  Anesthesia (see MAR for exact dosages):     Anesthesia method:  Local infiltration    Local anesthetic:  Lidocaine 1% WITH epi  Laceration details:     Location:  Face    Face location:  Nose    Length (cm):  2  Repair type:     Repair type:  Simple  Pre-procedure details:     Preparation:  Patient was prepped and draped in usual sterile fashion and imaging obtained to evaluate for foreign bodies  Exploration:     Hemostasis achieved with:  Epinephrine and direct pressure  Treatment:     Area cleansed with:  Shur-Clens, saline and Betadine    Amount of cleaning:  Extensive    Irrigation solution:  Sterile saline (copious)    Irrigation method:  Pressure wash and syringe    Visualized foreign bodies/material removed: no    Mucous membrane repair:     Suture size:  6-0    Suture material:  Vicryl    Suture technique:  Simple interrupted  Skin repair:     Repair method:  Sutures    Suture size:  6-0    Suture material:  Nylon    Number of sutures:  9  Approximation:     Approximation:  Close  Post-procedure details:     Dressing:  Antibiotic ointment    Patient tolerance of procedure:  Tolerated well, no  immediate complications  Laceration Repair  Date/Time: 11/3/2019 9:42 AM  Performed by: Darcie Rolle APRN  Authorized by: David Wang MD     Consent:     Consent obtained:  Verbal    Consent given by:  Patient    Risks discussed:  Poor cosmetic result, poor wound healing, need for additional repair, infection and pain    Alternatives discussed:  Delayed treatment and no treatment  Universal protocol:     Patient identity confirmed:  Verbally with patient  Anesthesia (see MAR for exact dosages):     Anesthesia method:  Local infiltration    Local anesthetic:  Lidocaine 1% WITH epi  Laceration details:     Location:  Face    Face location:  Forehead    Length (cm):  1  Repair type:     Repair type:  Simple  Pre-procedure details:     Preparation:  Patient was prepped and draped in usual sterile fashion  Exploration:     Hemostasis achieved with:  Epinephrine    Wound exploration: wound explored through full range of motion and entire depth of wound probed and visualized    Treatment:     Area cleansed with:  Shur-Clens, saline and Betadine    Amount of cleaning:  Extensive    Irrigation solution:  Sterile saline (copious)    Irrigation method:  Pressure wash    Visualized foreign bodies/material removed: no    Skin repair:     Repair method:  Sutures    Suture size:  6-0    Suture material:  Nylon    Number of sutures:  5  Approximation:     Approximation:  Close  Post-procedure details:     Dressing:  Antibiotic ointment    Patient tolerance of procedure:  Tolerated well, no immediate complications  Laceration Repair  Date/Time: 11/3/2019 10:35 AM  Performed by: Darcie Rolle APRN  Authorized by: David Wang MD     Consent:     Consent obtained:  Verbal    Consent given by:  Patient  Anesthesia (see MAR for exact dosages):     Anesthesia method:  None  Laceration details:     Location:  Lip    Lip location:  Upper exterior lip    Length (cm):  0.5  Repair type:     Repair type:  Simple  Pre-procedure  details:     Preparation:  Patient was prepped and draped in usual sterile fashion  Treatment:     Area cleansed with:  Saline, Betadine and Hibiclens    Amount of cleaning:  Standard    Irrigation solution:  Sterile saline    Irrigation method:  Pressure wash  Skin repair:     Repair method:  Tissue adhesive  Approximation:     Approximation:  Close    Vermilion border well-aligned: did not cross the vermillion border.    Post-procedure details:     Patient tolerance of procedure:  Tolerated well, no immediate complications        PROGRESS AND CONSULTS    Progress Notes:    ED Course as of Nov 03 1053   Sun Nov 03, 2019   0820 Slighhtly displaced fracture on L nasal bone. No other acute findings per ct face and head.  [TO]      ED Course User Index  [TO] DoorDarcie Vang, APRN     0748 CT Head without contrast ordered. CT Facial Bones without contrast ordered.    0750 Boostrix ordered.    0751 Laceration repair ordered. Xylocaine w/EPI ordered.     0847: Discussed the patient and plan of care with Dr. Wang. After a bedside evaluation; they agree with the plan of care.    0948: Patient rechecked. Pt states that they feel improvements. I discussed today's findings with the patient, explaining any pertinent positives and negatives from today's visit, and the plan of care.  I answered any of the patient's questions.  They were given appropriate follow-up with family physician and/or specialist. Patient is aware that discharge does not mean there is nothing wrong, it indicates no emergency is present and they must continue their care with a primary care provider and/or specialist. Given instructions for BP recheck with PCP. I advised them on when to return to the ED for further evaluation. The patient verbalized understanding. The patient's history, physical exam, and lab findings were discussed with the physician, who also performed a face to face history and physical exam. The patient was discharged in stable  "condition.     Disposition vitals:  /93   Pulse 88   Temp 96.8 °F (36 °C) (Tympanic)   Resp 18   Ht 177.8 cm (70\")   Wt 74 kg (163 lb 3.2 oz)   SpO2 100%   BMI 23.42 kg/m²       DIAGNOSIS  Final diagnoses:   Open fracture of nasal bone, initial encounter   Fall, initial encounter   Facial laceration, initial encounter       FOLLOW UP   Petra Webber, APRN  [address]  [facility] [id number] [phone]    Schedule an appointment as soon as possible for a visit       Octavio Clement MD  [address] [id number] [phone]    Schedule an appointment as soon as possible for a visit   ENT for follow up regarding nasal fracture      RX     Medication List      New Prescriptions    cephalexin 500 MG capsule  Commonly known as:  KEFLEX  Take 1 capsule by mouth 3 (Three) Times a Day.          Documentation assistance provided by linette Gonzalez for Darcie SOTOMAYOR.  Information recorded by the scribe was done at my direction and has been verified and validated by me.         Carlos Sofia  11/03/19 1054       Darcie Rolle APRN  11/03/19 1656    "

## 2019-11-03 NOTE — ED PROVIDER NOTES
Pt is a 69 y.o. female who presents to the ED complaining of facial injuries after a mechanical fall that occurred around 2200 last night. She was ambulating in the garage when she tripped over some lumber. Pt denies neck pain, LOC,  and anticoagulate use.  Denies any other injury anywhere else.      On exam,  Neurological: A&Ox3 patient looks well and is in no distress.  Skin: Laceration to the L forehead that is curvilinear, there is a laceration at the nasal bridge that appears as if there is a small defect of skin missing that was curvilinear. No active bleeding noted. There is no swelling or gross bony displacement appreciated. There is no tenderness to palpation.      Imaging reviewed.     Plan: D/w pt the CT scan results and the plan to DC pending laceration repair. Pt understands and agrees with the plan, all questions were answered at this time.   ED Course as of Nov 03 1048   Sun Nov 03, 2019   0820 Slighhtly displaced fracture on L nasal bone. No other acute findings per ct face and head.  [TO]      ED Course User Index  [TO] Darcie Rolle APRN MD ATTESTATION NOTE  The RILEY and I have discussed this patient's history, physical exam, and treatment plan.  I have reviewed the documentation and personally had a face to face interaction with the patient. I affirm the documentation and agree with the treatment and plan.  The attached note describes my personal findings.    Documentation assistance provided by linette Rivera for Dr. Wang. Information recorded by the scribe was done at my direction and has been verified and validated by me.     Donna Rivera  11/03/19 0908       David Wang MD  11/03/19 1048

## 2019-11-03 NOTE — ED TRIAGE NOTES
Pt to ED from home with reports of falling in garage yesterday evening and hitting face on construction materials.  Laceration noted to bridge of nose and left side of forehead with abrasions to nose and forehead.  Pt denies LOC, denies emesis after fall.  Pt currently awake, alert, answering questions appropriately.    Pt also has skin tear with bruising to right forearm.

## 2019-11-03 NOTE — DISCHARGE INSTRUCTIONS
-do not blow nose. Use afrin OTC spray for bleeding or congestion. NSAIDS for pain.    -Keep laceration clean. Keep dry for first 24 hours. Wash gently with soap and water twice daily, do not scrub region. Pat dry.  After washing, use antibiotic ointment to wound and you may cover wound with band-aid, gauze, or other dressing as desired. Do NOT submerge your wound in water, oils, or other liquids until sutures are removed.    -Sutures to be removed in 5-7 days, you will need to follow up with your primary care provider, an urgent care center, or return to the ED for suture removal.  It is important that a medical provider remove the sutures as an opportunity to examine the wound for proper healing and to ensure that no infection has developed.      -Return to the emergency department for signs of infection such as redness, streaking, drainage, fever, increased swelling, any new or other concerns.      We encourage all patients to follow up with your primary care provider for blood pressure recheck.  If you are a smoker, work on decreasing and stopping tobacco use. Follow up with your PCP to discuss medications that may assist in tobacco cessation if interested.

## 2019-11-12 ENCOUNTER — HOSPITAL ENCOUNTER (OUTPATIENT)
Facility: HOSPITAL | Age: 69
Setting detail: HOSPITAL OUTPATIENT SURGERY
Discharge: HOME | End: 2019-11-12
Attending: OTOLARYNGOLOGY | Admitting: OTOLARYNGOLOGY

## 2019-11-12 ENCOUNTER — ANESTHESIA EVENT (OUTPATIENT)
Dept: PERIOP | Facility: HOSPITAL | Age: 69
End: 2019-11-12

## 2019-11-12 ENCOUNTER — ANESTHESIA (OUTPATIENT)
Dept: PERIOP | Facility: HOSPITAL | Age: 69
End: 2019-11-12

## 2019-11-12 ENCOUNTER — APPOINTMENT (OUTPATIENT)
Dept: GENERAL RADIOLOGY | Facility: HOSPITAL | Age: 69
End: 2019-11-12

## 2019-11-12 VITALS
SYSTOLIC BLOOD PRESSURE: 114 MMHG | BODY MASS INDEX: 22.43 KG/M2 | WEIGHT: 156.7 LBS | DIASTOLIC BLOOD PRESSURE: 79 MMHG | HEART RATE: 68 BPM | TEMPERATURE: 97.9 F | RESPIRATION RATE: 16 BRPM | OXYGEN SATURATION: 96 % | HEIGHT: 70 IN

## 2019-11-12 LAB
APTT PPP: 24.1 SECONDS (ref 22.7–35.4)
BASOPHILS # BLD AUTO: 0.05 10*3/MM3 (ref 0–0.2)
BASOPHILS NFR BLD AUTO: 0.6 % (ref 0–1.5)
BILIRUB UR QL STRIP: NEGATIVE
CLARITY UR: CLEAR
COLOR UR: YELLOW
DEPRECATED RDW RBC AUTO: 41.1 FL (ref 37–54)
EOSINOPHIL # BLD AUTO: 0.09 10*3/MM3 (ref 0–0.4)
EOSINOPHIL NFR BLD AUTO: 1.1 % (ref 0.3–6.2)
ERYTHROCYTE [DISTWIDTH] IN BLOOD BY AUTOMATED COUNT: 11.8 % (ref 12.3–15.4)
GLUCOSE UR STRIP-MCNC: NEGATIVE MG/DL
HCT VFR BLD AUTO: 42 % (ref 34–46.6)
HGB BLD-MCNC: 14.7 G/DL (ref 12–15.9)
HGB UR QL STRIP.AUTO: NEGATIVE
IMM GRANULOCYTES # BLD AUTO: 0.01 10*3/MM3 (ref 0–0.05)
IMM GRANULOCYTES NFR BLD AUTO: 0.1 % (ref 0–0.5)
INR PPP: 1.1 (ref 0.9–1.1)
KETONES UR QL STRIP: NEGATIVE
LEUKOCYTE ESTERASE UR QL STRIP.AUTO: NEGATIVE
LYMPHOCYTES # BLD AUTO: 3.21 10*3/MM3 (ref 0.7–3.1)
LYMPHOCYTES NFR BLD AUTO: 40.2 % (ref 19.6–45.3)
MCH RBC QN AUTO: 33.2 PG (ref 26.6–33)
MCHC RBC AUTO-ENTMCNC: 35 G/DL (ref 31.5–35.7)
MCV RBC AUTO: 94.8 FL (ref 79–97)
MONOCYTES # BLD AUTO: 0.96 10*3/MM3 (ref 0.1–0.9)
MONOCYTES NFR BLD AUTO: 12 % (ref 5–12)
NEUTROPHILS # BLD AUTO: 3.67 10*3/MM3 (ref 1.7–7)
NEUTROPHILS NFR BLD AUTO: 46 % (ref 42.7–76)
NITRITE UR QL STRIP: NEGATIVE
NRBC BLD AUTO-RTO: 0 /100 WBC (ref 0–0.2)
PH UR STRIP.AUTO: 5.5 [PH] (ref 5–8)
PLATELET # BLD AUTO: 219 10*3/MM3 (ref 140–450)
PMV BLD AUTO: 10.1 FL (ref 6–12)
PROT UR QL STRIP: NEGATIVE
PROTHROMBIN TIME: 13.9 SECONDS (ref 11.7–14.2)
RBC # BLD AUTO: 4.43 10*6/MM3 (ref 3.77–5.28)
SP GR UR STRIP: 1.01 (ref 1–1.03)
UROBILINOGEN UR QL STRIP: NORMAL
WBC NRBC COR # BLD: 7.99 10*3/MM3 (ref 3.4–10.8)

## 2019-11-12 PROCEDURE — 25010000002 SUCCINYLCHOLINE PER 20 MG: Performed by: NURSE ANESTHETIST, CERTIFIED REGISTERED

## 2019-11-12 PROCEDURE — 25010000002 DEXAMETHASONE PER 1 MG: Performed by: NURSE ANESTHETIST, CERTIFIED REGISTERED

## 2019-11-12 PROCEDURE — 25010000002 PROPOFOL 10 MG/ML EMULSION: Performed by: NURSE ANESTHETIST, CERTIFIED REGISTERED

## 2019-11-12 PROCEDURE — 25010000002 FENTANYL CITRATE (PF) 100 MCG/2ML SOLUTION: Performed by: NURSE ANESTHETIST, CERTIFIED REGISTERED

## 2019-11-12 PROCEDURE — 81003 URINALYSIS AUTO W/O SCOPE: CPT | Performed by: OTOLARYNGOLOGY

## 2019-11-12 PROCEDURE — 93005 ELECTROCARDIOGRAM TRACING: CPT | Performed by: OTOLARYNGOLOGY

## 2019-11-12 PROCEDURE — 93010 ELECTROCARDIOGRAM REPORT: CPT | Performed by: INTERNAL MEDICINE

## 2019-11-12 PROCEDURE — 85610 PROTHROMBIN TIME: CPT | Performed by: OTOLARYNGOLOGY

## 2019-11-12 PROCEDURE — 85025 COMPLETE CBC W/AUTO DIFF WBC: CPT | Performed by: OTOLARYNGOLOGY

## 2019-11-12 PROCEDURE — 25010000002 MIDAZOLAM PER 1 MG: Performed by: ANESTHESIOLOGY

## 2019-11-12 PROCEDURE — 85730 THROMBOPLASTIN TIME PARTIAL: CPT | Performed by: OTOLARYNGOLOGY

## 2019-11-12 PROCEDURE — 25010000002 ONDANSETRON PER 1 MG: Performed by: NURSE ANESTHETIST, CERTIFIED REGISTERED

## 2019-11-12 PROCEDURE — 71046 X-RAY EXAM CHEST 2 VIEWS: CPT

## 2019-11-12 RX ORDER — FENTANYL CITRATE 50 UG/ML
50 INJECTION, SOLUTION INTRAMUSCULAR; INTRAVENOUS
Status: DISCONTINUED | OUTPATIENT
Start: 2019-11-12 | End: 2019-11-12 | Stop reason: HOSPADM

## 2019-11-12 RX ORDER — DIPHENHYDRAMINE HYDROCHLORIDE 50 MG/ML
12.5 INJECTION INTRAMUSCULAR; INTRAVENOUS
Status: DISCONTINUED | OUTPATIENT
Start: 2019-11-12 | End: 2019-11-12 | Stop reason: HOSPADM

## 2019-11-12 RX ORDER — ONDANSETRON 2 MG/ML
4 INJECTION INTRAMUSCULAR; INTRAVENOUS ONCE AS NEEDED
Status: DISCONTINUED | OUTPATIENT
Start: 2019-11-12 | End: 2019-11-12 | Stop reason: HOSPADM

## 2019-11-12 RX ORDER — MAGNESIUM HYDROXIDE 1200 MG/15ML
LIQUID ORAL AS NEEDED
Status: DISCONTINUED | OUTPATIENT
Start: 2019-11-12 | End: 2019-11-12 | Stop reason: HOSPADM

## 2019-11-12 RX ORDER — HYDROCODONE BITARTRATE AND ACETAMINOPHEN 7.5; 325 MG/1; MG/1
1 TABLET ORAL ONCE AS NEEDED
Status: COMPLETED | OUTPATIENT
Start: 2019-11-12 | End: 2019-11-12

## 2019-11-12 RX ORDER — FLUMAZENIL 0.1 MG/ML
0.2 INJECTION INTRAVENOUS AS NEEDED
Status: DISCONTINUED | OUTPATIENT
Start: 2019-11-12 | End: 2019-11-12 | Stop reason: HOSPADM

## 2019-11-12 RX ORDER — MEPERIDINE HYDROCHLORIDE 25 MG/ML
12.5 INJECTION INTRAMUSCULAR; INTRAVENOUS; SUBCUTANEOUS
Status: DISCONTINUED | OUTPATIENT
Start: 2019-11-12 | End: 2019-11-12 | Stop reason: HOSPADM

## 2019-11-12 RX ORDER — FENTANYL CITRATE 50 UG/ML
INJECTION, SOLUTION INTRAMUSCULAR; INTRAVENOUS AS NEEDED
Status: DISCONTINUED | OUTPATIENT
Start: 2019-11-12 | End: 2019-11-12 | Stop reason: SURG

## 2019-11-12 RX ORDER — PROMETHAZINE HYDROCHLORIDE 25 MG/ML
12.5 INJECTION, SOLUTION INTRAMUSCULAR; INTRAVENOUS ONCE AS NEEDED
Status: DISCONTINUED | OUTPATIENT
Start: 2019-11-12 | End: 2019-11-12 | Stop reason: HOSPADM

## 2019-11-12 RX ORDER — LIDOCAINE HYDROCHLORIDE 40 MG/ML
SOLUTION TOPICAL AS NEEDED
Status: DISCONTINUED | OUTPATIENT
Start: 2019-11-12 | End: 2019-11-12 | Stop reason: SURG

## 2019-11-12 RX ORDER — SODIUM CHLORIDE, SODIUM LACTATE, POTASSIUM CHLORIDE, CALCIUM CHLORIDE 600; 310; 30; 20 MG/100ML; MG/100ML; MG/100ML; MG/100ML
100 INJECTION, SOLUTION INTRAVENOUS CONTINUOUS
Status: DISCONTINUED | OUTPATIENT
Start: 2019-11-12 | End: 2019-11-12 | Stop reason: HOSPADM

## 2019-11-12 RX ORDER — ACETAMINOPHEN 325 MG/1
650 TABLET ORAL ONCE AS NEEDED
Status: DISCONTINUED | OUTPATIENT
Start: 2019-11-12 | End: 2019-11-12 | Stop reason: HOSPADM

## 2019-11-12 RX ORDER — MIDAZOLAM HYDROCHLORIDE 1 MG/ML
1 INJECTION INTRAMUSCULAR; INTRAVENOUS
Status: DISCONTINUED | OUTPATIENT
Start: 2019-11-12 | End: 2019-11-12 | Stop reason: HOSPADM

## 2019-11-12 RX ORDER — HYDROMORPHONE HYDROCHLORIDE 1 MG/ML
0.5 INJECTION, SOLUTION INTRAMUSCULAR; INTRAVENOUS; SUBCUTANEOUS
Status: DISCONTINUED | OUTPATIENT
Start: 2019-11-12 | End: 2019-11-12 | Stop reason: HOSPADM

## 2019-11-12 RX ORDER — SODIUM CHLORIDE 0.9 % (FLUSH) 0.9 %
3-10 SYRINGE (ML) INJECTION AS NEEDED
Status: DISCONTINUED | OUTPATIENT
Start: 2019-11-12 | End: 2019-11-12 | Stop reason: HOSPADM

## 2019-11-12 RX ORDER — SODIUM CHLORIDE 0.9 % (FLUSH) 0.9 %
3 SYRINGE (ML) INJECTION EVERY 12 HOURS SCHEDULED
Status: DISCONTINUED | OUTPATIENT
Start: 2019-11-12 | End: 2019-11-12 | Stop reason: HOSPADM

## 2019-11-12 RX ORDER — EPHEDRINE SULFATE 50 MG/ML
5 INJECTION, SOLUTION INTRAVENOUS ONCE AS NEEDED
Status: DISCONTINUED | OUTPATIENT
Start: 2019-11-12 | End: 2019-11-12 | Stop reason: HOSPADM

## 2019-11-12 RX ORDER — SODIUM CHLORIDE, SODIUM LACTATE, POTASSIUM CHLORIDE, CALCIUM CHLORIDE 600; 310; 30; 20 MG/100ML; MG/100ML; MG/100ML; MG/100ML
9 INJECTION, SOLUTION INTRAVENOUS CONTINUOUS
Status: DISCONTINUED | OUTPATIENT
Start: 2019-11-12 | End: 2019-11-12 | Stop reason: HOSPADM

## 2019-11-12 RX ORDER — ONDANSETRON 2 MG/ML
INJECTION INTRAMUSCULAR; INTRAVENOUS AS NEEDED
Status: DISCONTINUED | OUTPATIENT
Start: 2019-11-12 | End: 2019-11-12 | Stop reason: SURG

## 2019-11-12 RX ORDER — PROPOFOL 10 MG/ML
VIAL (ML) INTRAVENOUS AS NEEDED
Status: DISCONTINUED | OUTPATIENT
Start: 2019-11-12 | End: 2019-11-12 | Stop reason: SURG

## 2019-11-12 RX ORDER — ROCURONIUM BROMIDE 10 MG/ML
INJECTION, SOLUTION INTRAVENOUS AS NEEDED
Status: DISCONTINUED | OUTPATIENT
Start: 2019-11-12 | End: 2019-11-12 | Stop reason: SURG

## 2019-11-12 RX ORDER — DIPHENHYDRAMINE HCL 25 MG
25 CAPSULE ORAL
Status: DISCONTINUED | OUTPATIENT
Start: 2019-11-12 | End: 2019-11-12 | Stop reason: HOSPADM

## 2019-11-12 RX ORDER — DEXAMETHASONE SODIUM PHOSPHATE 10 MG/ML
INJECTION INTRAMUSCULAR; INTRAVENOUS AS NEEDED
Status: DISCONTINUED | OUTPATIENT
Start: 2019-11-12 | End: 2019-11-12 | Stop reason: SURG

## 2019-11-12 RX ORDER — HYDROCODONE BITARTRATE AND ACETAMINOPHEN 5; 325 MG/1; MG/1
1 TABLET ORAL EVERY 4 HOURS PRN
Qty: 30 TABLET | Refills: 0 | Status: SHIPPED | OUTPATIENT
Start: 2019-11-12 | End: 2020-11-10

## 2019-11-12 RX ORDER — SUCCINYLCHOLINE CHLORIDE 20 MG/ML
INJECTION INTRAMUSCULAR; INTRAVENOUS AS NEEDED
Status: DISCONTINUED | OUTPATIENT
Start: 2019-11-12 | End: 2019-11-12 | Stop reason: SURG

## 2019-11-12 RX ORDER — PROMETHAZINE HYDROCHLORIDE 25 MG/1
25 SUPPOSITORY RECTAL ONCE AS NEEDED
Status: DISCONTINUED | OUTPATIENT
Start: 2019-11-12 | End: 2019-11-12 | Stop reason: HOSPADM

## 2019-11-12 RX ORDER — PROMETHAZINE HYDROCHLORIDE 25 MG/1
25 TABLET ORAL ONCE AS NEEDED
Status: DISCONTINUED | OUTPATIENT
Start: 2019-11-12 | End: 2019-11-12 | Stop reason: HOSPADM

## 2019-11-12 RX ORDER — LIDOCAINE HYDROCHLORIDE 20 MG/ML
INJECTION, SOLUTION INFILTRATION; PERINEURAL AS NEEDED
Status: DISCONTINUED | OUTPATIENT
Start: 2019-11-12 | End: 2019-11-12 | Stop reason: SURG

## 2019-11-12 RX ORDER — OXYCODONE AND ACETAMINOPHEN 7.5; 325 MG/1; MG/1
1 TABLET ORAL ONCE AS NEEDED
Status: DISCONTINUED | OUTPATIENT
Start: 2019-11-12 | End: 2019-11-12 | Stop reason: HOSPADM

## 2019-11-12 RX ORDER — NALOXONE HCL 0.4 MG/ML
0.2 VIAL (ML) INJECTION AS NEEDED
Status: DISCONTINUED | OUTPATIENT
Start: 2019-11-12 | End: 2019-11-12 | Stop reason: HOSPADM

## 2019-11-12 RX ORDER — HYDRALAZINE HYDROCHLORIDE 20 MG/ML
5 INJECTION INTRAMUSCULAR; INTRAVENOUS
Status: DISCONTINUED | OUTPATIENT
Start: 2019-11-12 | End: 2019-11-12 | Stop reason: HOSPADM

## 2019-11-12 RX ORDER — HYDROCODONE BITARTRATE AND ACETAMINOPHEN 5; 325 MG/1; MG/1
1 TABLET ORAL ONCE AS NEEDED
Status: DISCONTINUED | OUTPATIENT
Start: 2019-11-12 | End: 2019-11-12 | Stop reason: HOSPADM

## 2019-11-12 RX ORDER — PROMETHAZINE HYDROCHLORIDE 25 MG/ML
6.25 INJECTION, SOLUTION INTRAMUSCULAR; INTRAVENOUS
Status: DISCONTINUED | OUTPATIENT
Start: 2019-11-12 | End: 2019-11-12 | Stop reason: HOSPADM

## 2019-11-12 RX ORDER — FAMOTIDINE 10 MG/ML
20 INJECTION, SOLUTION INTRAVENOUS ONCE
Status: COMPLETED | OUTPATIENT
Start: 2019-11-12 | End: 2019-11-12

## 2019-11-12 RX ORDER — LIDOCAINE HYDROCHLORIDE 10 MG/ML
0.5 INJECTION, SOLUTION EPIDURAL; INFILTRATION; INTRACAUDAL; PERINEURAL ONCE AS NEEDED
Status: DISCONTINUED | OUTPATIENT
Start: 2019-11-12 | End: 2019-11-12 | Stop reason: HOSPADM

## 2019-11-12 RX ORDER — MIDAZOLAM HYDROCHLORIDE 1 MG/ML
2 INJECTION INTRAMUSCULAR; INTRAVENOUS
Status: DISCONTINUED | OUTPATIENT
Start: 2019-11-12 | End: 2019-11-12 | Stop reason: HOSPADM

## 2019-11-12 RX ORDER — IPRATROPIUM BROMIDE AND ALBUTEROL SULFATE 2.5; .5 MG/3ML; MG/3ML
3 SOLUTION RESPIRATORY (INHALATION) ONCE AS NEEDED
Status: DISCONTINUED | OUTPATIENT
Start: 2019-11-12 | End: 2019-11-12 | Stop reason: HOSPADM

## 2019-11-12 RX ADMIN — SUCCINYLCHOLINE CHLORIDE 100 MG: 20 INJECTION, SOLUTION INTRAMUSCULAR; INTRAVENOUS; PARENTERAL at 16:20

## 2019-11-12 RX ADMIN — SODIUM CHLORIDE, POTASSIUM CHLORIDE, SODIUM LACTATE AND CALCIUM CHLORIDE 9 ML/HR: 600; 310; 30; 20 INJECTION, SOLUTION INTRAVENOUS at 13:52

## 2019-11-12 RX ADMIN — DEXAMETHASONE SODIUM PHOSPHATE 6 MG: 10 INJECTION INTRAMUSCULAR; INTRAVENOUS at 16:25

## 2019-11-12 RX ADMIN — PROPOFOL 130 MG: 10 INJECTION, EMULSION INTRAVENOUS at 16:19

## 2019-11-12 RX ADMIN — LIDOCAINE HYDROCHLORIDE 50 MG: 20 INJECTION, SOLUTION INFILTRATION; PERINEURAL at 16:19

## 2019-11-12 RX ADMIN — FENTANYL CITRATE 25 MCG: 50 INJECTION INTRAMUSCULAR; INTRAVENOUS at 16:15

## 2019-11-12 RX ADMIN — FAMOTIDINE 20 MG: 10 INJECTION INTRAVENOUS at 13:52

## 2019-11-12 RX ADMIN — HYDROCODONE BITARTRATE AND ACETAMINOPHEN 1 TABLET: 7.5; 325 TABLET ORAL at 17:27

## 2019-11-12 RX ADMIN — MIDAZOLAM 1 MG: 1 INJECTION INTRAMUSCULAR; INTRAVENOUS at 16:05

## 2019-11-12 RX ADMIN — FENTANYL CITRATE 50 MCG: 50 INJECTION INTRAMUSCULAR; INTRAVENOUS at 17:00

## 2019-11-12 RX ADMIN — FENTANYL CITRATE 25 MCG: 50 INJECTION INTRAMUSCULAR; INTRAVENOUS at 16:40

## 2019-11-12 RX ADMIN — ROCURONIUM BROMIDE 10 MG: 10 INJECTION INTRAVENOUS at 16:19

## 2019-11-12 RX ADMIN — LIDOCAINE HYDROCHLORIDE 1 EACH: 40 SOLUTION TOPICAL at 16:21

## 2019-11-12 RX ADMIN — ONDANSETRON 4 MG: 2 INJECTION INTRAMUSCULAR; INTRAVENOUS at 16:40

## 2019-11-12 NOTE — ANESTHESIA PREPROCEDURE EVALUATION
Anesthesia Evaluation     Patient summary reviewed and Nursing notes reviewed   NPO Solid Status: > 8 hours  NPO Liquid Status: > 2 hours           Airway   Mallampati: II  Neck ROM: full  No difficulty expected  Dental      Comment: Crowns in rear    Pulmonary     breath sounds clear to auscultation  (+) asthma,  Cardiovascular     Rhythm: regular    (+) hyperlipidemia,       Neuro/Psych  GI/Hepatic/Renal/Endo    (+)  GERD,      Musculoskeletal     Abdominal    Substance History      OB/GYN          Other                        Anesthesia Plan    ASA 2     general     intravenous induction     Anesthetic plan, all risks, benefits, and alternatives have been provided, discussed and informed consent has been obtained with: patient.

## 2019-11-12 NOTE — OP NOTE
[facility] OPERATIVE NOTE  11/12/2019    NAME: April Feliz    YOB: 1950  MRN: [id number]    PRE-OPERATIVE DIAGNOSIS:  Nasal Fracture    POST-OPERATIVE DIAGNOSIS:  same    PROCEDURE PERFORMED: Closed Reduction Nasal Fracture    SURGEON: Delgado Grijalva MD    ASSISTANT(S): None    ANESTHESIA: General Anesthesia via Endotracheal Tube    INDICATIONS: The patient is a 69 y.o. female with Nasal Fracture    PROCEDURE:  The patient was brought to the operating room, given General Anesthesia via Endotracheal Tube, and prepped and draped in the usual manner.     The nose was cocainized in the usual manner.  Fracture was noted on the left side.  Using the Krishna forceps and Blount elevator nasal fragments were put back in place.  Was controlled with Surgicel.  Steri-Strips and external splint were applied in the usual manner.     The patient tolerated the procedure well and was returned to the recovery room in satisfactory condition.    SPECIMENS: None    COMPLICATIONS: NONE    ESTIMATED BLOOD LOSS: < 5cc    Delgado Grijalva MD  11/12/2019

## 2019-11-12 NOTE — ANESTHESIA PROCEDURE NOTES
Airway  Urgency: elective    Date/Time: 11/12/2019 4:21 PM  Airway not difficult    General Information and Staff    Patient location during procedure: OR  Anesthesiologist: Melvin Dobson MD  CRNA: Nan Adam CRNA    Indications and Patient Condition  Indications for airway management: airway protection    Preoxygenated: yes  MILS maintained throughout  Mask difficulty assessment: 1 - vent by mask    Final Airway Details  Final airway type: endotracheal airway      Successful airway: JONES tube  Cuffed: yes   Successful intubation technique: direct laryngoscopy  Endotracheal tube insertion site: oral  Blade: Lisa  Blade size: 3  Cormack-Lehane Classification: grade IIa - partial view of glottis  Placement verified by: chest auscultation and capnometry   Measured from: lips  Number of attempts at approach: 1  Assessment: lips, teeth, and gum same as pre-op and atraumatic intubation    Additional Comments  Atraumatic ET Tube placement.  Teeth as pre-op. BLEBS.  -ABD sounds.  +ET CO2.  Secured to face

## 2019-11-13 NOTE — ANESTHESIA POSTPROCEDURE EVALUATION
"Patient: April Feliz    Procedure Summary     Date:  11/12/19 Room / Location:  [facility] OR  / [facility] MAIN OR    Anesthesia Start:  1612 Anesthesia Stop:  1707    Procedure:  CLOSED REDUCTION NASAL FRACTURE (N/A Nose) Diagnosis:      Surgeon:  Delgado Grijalva MD Provider:  Melvin Dobson MD    Anesthesia Type:  general ASA Status:  2          Anesthesia Type: general  Last vitals  BP   114/79 (11/12/19 1725)   Temp   36.6 °C (97.9 °F) (11/12/19 1725)   Pulse   68 (11/12/19 1725)   Resp   16 (11/12/19 1725)     SpO2   96 % (11/12/19 1725)     Post Anesthesia Care and Evaluation      Comments: Patient discharged before being evaluated by an Anesthesiologist. No apparent complications per the record.  This case was not medically directed. I am completing this chart for medical records purposes; I personally have no medical involvement with this patient.    /79 (BP Location: Right arm, Patient Position: Lying)   Pulse 68   Temp 36.6 °C (97.9 °F) (Oral)   Resp 16   Ht 177.8 cm (70\")   Wt 71.1 kg (156 lb 11.2 oz)   SpO2 96%   BMI 22.48 kg/m²           "

## 2019-11-27 ENCOUNTER — APPOINTMENT (OUTPATIENT)
Dept: WOMENS IMAGING | Facility: HOSPITAL | Age: 69
End: 2019-11-27

## 2019-11-27 ENCOUNTER — PROCEDURE VISIT (OUTPATIENT)
Dept: OBSTETRICS AND GYNECOLOGY | Facility: CLINIC | Age: 69
End: 2019-11-27

## 2019-11-27 DIAGNOSIS — Z12.31 VISIT FOR SCREENING MAMMOGRAM: Primary | ICD-10-CM

## 2019-11-27 PROCEDURE — 77063 BREAST TOMOSYNTHESIS BI: CPT | Performed by: OBSTETRICS & GYNECOLOGY

## 2019-11-27 PROCEDURE — 77063 BREAST TOMOSYNTHESIS BI: CPT | Performed by: RADIOLOGY

## 2019-11-27 PROCEDURE — 77067 SCR MAMMO BI INCL CAD: CPT | Performed by: RADIOLOGY

## 2019-11-27 PROCEDURE — 77067 SCR MAMMO BI INCL CAD: CPT | Performed by: OBSTETRICS & GYNECOLOGY

## 2020-10-19 ENCOUNTER — TELEPHONE (OUTPATIENT)
Dept: OBSTETRICS AND GYNECOLOGY | Facility: CLINIC | Age: 70
End: 2020-10-19

## 2020-10-19 RX ORDER — ALENDRONATE SODIUM 70 MG/1
70 TABLET ORAL
Qty: 12 TABLET | Refills: 3 | Status: SHIPPED | OUTPATIENT
Start: 2020-10-19 | End: 2020-11-10 | Stop reason: SDUPTHER

## 2020-11-10 ENCOUNTER — OFFICE VISIT (OUTPATIENT)
Dept: OBSTETRICS AND GYNECOLOGY | Facility: CLINIC | Age: 70
End: 2020-11-10

## 2020-11-10 VITALS
HEIGHT: 70 IN | SYSTOLIC BLOOD PRESSURE: 159 MMHG | WEIGHT: 167 LBS | HEART RATE: 84 BPM | DIASTOLIC BLOOD PRESSURE: 101 MMHG | BODY MASS INDEX: 23.91 KG/M2

## 2020-11-10 DIAGNOSIS — Z91.89 HIGH RISK FOR HIP FRACTURE: Primary | ICD-10-CM

## 2020-11-10 DIAGNOSIS — M81.8 AGE-RELATED OSTEOPOROSIS WITHOUT FRACTURE: ICD-10-CM

## 2020-11-10 PROCEDURE — 99213 OFFICE O/P EST LOW 20 MIN: CPT | Performed by: OBSTETRICS & GYNECOLOGY

## 2020-11-10 RX ORDER — ALENDRONATE SODIUM 70 MG/1
70 TABLET ORAL
Qty: 12 TABLET | Refills: 3 | Status: SHIPPED | OUTPATIENT
Start: 2020-11-10 | End: 2021-10-05 | Stop reason: SDUPTHER

## 2020-11-10 RX ORDER — ATORVASTATIN CALCIUM 20 MG/1
TABLET, FILM COATED ORAL
COMMUNITY
Start: 2020-11-07

## 2020-11-10 NOTE — PROGRESS NOTES
"Subjective    is a 70 y.o. female here for medication refill. Medicare pt and last annual 2019.   Pt needing refill on Fosamax.     Follow up osteopenia at risk for hip fracture      HPI    70 y.o.    On alendronate for osteoporosis with elevated risk of fracture.   Has been on for several years and doing well as far as tolerance of this   Per records started therapy in  so now at 6 years.     Review of Systems   Gastrointestinal: Negative for abdominal pain, constipation and diarrhea.   Genitourinary: Negative for dysuria and pelvic pain.   Musculoskeletal: Negative for arthralgias and myalgias.        Objective   BP (!) 159/101   Pulse 84   Ht 177.8 cm (70\")   Wt 75.8 kg (167 lb)   Breastfeeding No   BMI 23.96 kg/m²   Physical Exam  Constitutional:       General: She is not in acute distress.     Appearance: Normal appearance. She is well-developed and normal weight.   HENT:      Head: Normocephalic and atraumatic.   Neck:      Thyroid: No thyromegaly.   Pulmonary:      Effort: No respiratory distress.   Abdominal:      General: Abdomen is flat. There is no distension.      Palpations: Abdomen is soft.      Tenderness: There is no abdominal tenderness.   Musculoskeletal: Normal range of motion.      Right lower leg: No edema.      Left lower leg: No edema.   Neurological:      General: No focal deficit present.      Mental Status: She is alert and oriented to person, place, and time.   Skin:     General: Skin is warm and dry.   Psychiatric:         Behavior: Behavior normal.         Thought Content: Thought content normal.         Judgment: Judgment normal.   Vitals signs reviewed.        DEXA  with Neck density at 0.568  DEXA 2019 with neck density 0.605     Assessment/Plan   Diagnoses and all orders for this visit:    1. High risk for hip fracture (Primary)    2. Age-related osteoporosis without fracture    Other orders  -     alendronate (FOSAMAX) 70 MG tablet; Take 1 tablet by mouth " Every 7 (Seven) Days. SATURDAYS  Dispense: 12 tablet; Refill: 3    - Looking back to on about 6 years of treatment.   Discussed options   Will continue until at least next year (7 years)   Recheck DEXA next year to determine if maintaining her bone density   Discussed continue through 10 years vs change to Prolia  Concern for lifelong therapy with prolia discussed.     Will try and repeat DEXA next year prior to visit. (can do after August)       Jerel Lincoln MD   11/10/2020  09:28 EST

## 2020-11-30 ENCOUNTER — PROCEDURE VISIT (OUTPATIENT)
Dept: OBSTETRICS AND GYNECOLOGY | Facility: CLINIC | Age: 70
End: 2020-11-30

## 2020-11-30 ENCOUNTER — APPOINTMENT (OUTPATIENT)
Dept: WOMENS IMAGING | Facility: HOSPITAL | Age: 70
End: 2020-11-30

## 2020-11-30 DIAGNOSIS — Z12.31 VISIT FOR SCREENING MAMMOGRAM: Primary | ICD-10-CM

## 2020-11-30 PROCEDURE — 77063 BREAST TOMOSYNTHESIS BI: CPT | Performed by: RADIOLOGY

## 2020-11-30 PROCEDURE — 77063 BREAST TOMOSYNTHESIS BI: CPT | Performed by: OBSTETRICS & GYNECOLOGY

## 2020-11-30 PROCEDURE — 77067 SCR MAMMO BI INCL CAD: CPT | Performed by: OBSTETRICS & GYNECOLOGY

## 2020-11-30 PROCEDURE — 77067 SCR MAMMO BI INCL CAD: CPT | Performed by: RADIOLOGY

## 2021-10-05 ENCOUNTER — OFFICE VISIT (OUTPATIENT)
Dept: OBSTETRICS AND GYNECOLOGY | Facility: CLINIC | Age: 71
End: 2021-10-05

## 2021-10-05 VITALS
HEIGHT: 70 IN | BODY MASS INDEX: 22.62 KG/M2 | WEIGHT: 158 LBS | HEART RATE: 78 BPM | DIASTOLIC BLOOD PRESSURE: 83 MMHG | SYSTOLIC BLOOD PRESSURE: 125 MMHG

## 2021-10-05 DIAGNOSIS — Z91.89 HIGH RISK FOR HIP FRACTURE: ICD-10-CM

## 2021-10-05 DIAGNOSIS — M81.8 AGE-RELATED OSTEOPOROSIS WITHOUT FRACTURE: ICD-10-CM

## 2021-10-05 DIAGNOSIS — Z01.419 ENCOUNTER FOR GYNECOLOGICAL EXAMINATION WITHOUT ABNORMAL FINDING: Primary | ICD-10-CM

## 2021-10-05 PROCEDURE — G0101 CA SCREEN;PELVIC/BREAST EXAM: HCPCS | Performed by: OBSTETRICS & GYNECOLOGY

## 2021-10-05 RX ORDER — ALENDRONATE SODIUM 70 MG/1
70 TABLET ORAL
Qty: 12 TABLET | Refills: 3 | Status: SHIPPED | OUTPATIENT
Start: 2021-10-05 | End: 2023-02-15 | Stop reason: SDUPTHER

## 2021-10-05 NOTE — PROGRESS NOTES
GYN Annual Exam     CC- Here for annual exam.     April Feliz is a 71 y.o. female who presents for annual well woman exam. Periods are absent due to Menoapause.     OB History        2    Para   2    Term   2            AB        Living   2       SAB        TAB        Ectopic        Molar        Multiple        Live Births                    Current contraception: post menopausal status  History of abnormal Pap smear: yes - Cryo in the past   Family history of uterine, colon or ovarian cancer: yes - colon cancer   History of abnormal mammogram: no  Family history of breast cancer: yes - mother   Last Pap : 2019 NL HPV neg  Last mammogram: 2020   Last colonoscopy: 2019  Last DEXA: 2019 Osteopenia  Parental Hip Fracture: No    Past Medical History:   Diagnosis Date   • Abdominal hernia    • Acid reflux    • Asthma    • Hyperlipidemia    • Lymphocytic colitis    • S/P wrist surgery    • Seasonal allergies    • Thyroid nodule        Past Surgical History:   Procedure Laterality Date   • BRONCHOSCOPY N/A 2017    Procedure: BRONCHOSCOPY WITH BAL and brushings;  Surgeon: David Castellanos MD;  Location: [facility] ENDOSCOPY;  Service:    • COLON RESECTION     • COLONOSCOPY     • GANGLION CYST EXCISION Right    • NASAL FRACTURE CLOSED REDUCTION N/A 2019    Procedure: CLOSED REDUCTION NASAL FRACTURE;  Surgeon: Delgado Grijalva MD;  Location: [facility] OR;  Service: ENT   • ORIF WRIST FRACTURE Left 2018    Procedure: ULNA/RADIUS OPEN REDUCTION INTERNAL FIXATION;  Surgeon: Bhanu Rosenthal MD;  Location: [facility] OR;  Service:    • TUBAL ABDOMINAL LIGATION     • VENTRAL HERNIA REPAIR N/A 2017    Procedure: VENTRAL HERNIA REPAIR LAPAROSCOPIC WITH DAVINCI ROBOT;  Surgeon: Nayeli Goldstein MD;  Location: [facility] OR;  Service:          Current Outpatient Medications:   •  albuterol sulfate  (90 Base) MCG/ACT inhaler, Inhale 2 puffs As  Needed., Disp: , Rfl:   •  alendronate (FOSAMAX) 70 MG tablet, Take 1 tablet by mouth Every 7 (Seven) Days. SATURDAYS, Disp: 12 tablet, Rfl: 3  •  atorvastatin (LIPITOR) 20 MG tablet, , Disp: , Rfl:   •  Calcium Citrate-Vitamin D (CALCIUM + D PO), Take 1 tablet by mouth Daily., Disp: , Rfl:   •  cetirizine (zyrTEC) 10 MG tablet, Take 10 mg by mouth Daily., Disp: , Rfl:   •  fluticasone-salmeterol (ADVAIR) 100-50 MCG/DOSE DISKUS, Inhale 1 puff 2 (Two) Times a Day., Disp: , Rfl:   •  loperamide (IMODIUM) 1 MG/5ML solution, Take  by mouth., Disp: , Rfl:   •  mometasone (NASONEX) 50 MCG/ACT nasal spray, 2 sprays into each nostril Daily As Needed., Disp: , Rfl:   •  montelukast (SINGULAIR) 10 MG tablet, Take 10 mg by mouth Every Night., Disp: , Rfl:   •  Multiple Vitamins-Minerals (MULTIVITAMIN ADULT PO), Take 1 tablet by mouth Daily., Disp: , Rfl:   •  TURMERIC PO, Take 1 capsule by mouth Daily., Disp: , Rfl:     Allergies   Allergen Reactions   • Penicillins Rash       Social History     Tobacco Use   • Smoking status: Former Smoker     Packs/day: 0.50     Years: 10.00     Pack years: 5.00     Types: Cigarettes   • Smokeless tobacco: Never Used   • Tobacco comment: 33YRS AGO   Substance Use Topics   • Alcohol use: Yes     Alcohol/week: 5.0 standard drinks     Types: 5 Cans of beer per week   • Drug use: No       Family History   Problem Relation Age of Onset   • Breast cancer Mother    • Colon cancer Maternal Grandmother    • Breast cancer Maternal Aunt    • Pancreatic cancer Father    • Lymphoma Sister    • Malig Hyperthermia Neg Hx    • Ovarian cancer Neg Hx    • Uterine cancer Neg Hx    • Deep vein thrombosis Neg Hx    • Pulmonary embolism Neg Hx        Review of Systems   Constitutional: Negative for chills, fever and unexpected weight loss.   Gastrointestinal: Negative for abdominal pain.   Genitourinary: Negative for dysuria, pelvic pain, vaginal bleeding and vaginal discharge.   All other systems reviewed and  "are negative.      /83   Pulse 78   Ht 177.8 cm (70\")   Wt 71.7 kg (158 lb)   Breastfeeding No   BMI 22.67 kg/m²     Physical Exam  Constitutional:       General: She is not in acute distress.     Appearance: She is well-developed. She is obese.   Genitourinary:      Pelvic exam was performed with patient supine.      Vulva, urethra, bladder, vagina, right adnexa, left adnexa and rectum normal.      No vulval lesion or Bartholin's cyst noted.      No posterior fourchette lesion present.      No inguinal adenopathy present in the right or left side.     Vaginal atrophy present.      No vaginal discharge or bleeding.      No cervical motion tenderness or friability.      Uterus is not enlarged or tender.      No uterine mass detected.     Uterus is anteverted and regular.      Right and left adnexa are non-palpable.   Rectum:      No rectal mass or abnormal anal tone.   HENT:      Head: Normocephalic and atraumatic.   Eyes:      Conjunctiva/sclera: Conjunctivae normal.      Pupils: Pupils are equal, round, and reactive to light.   Neck:      Thyroid: No thyromegaly.   Cardiovascular:      Rate and Rhythm: Normal rate and regular rhythm.      Heart sounds: Normal heart sounds. No murmur heard.     Pulmonary:      Effort: Pulmonary effort is normal. No respiratory distress.      Breath sounds: Normal breath sounds.   Chest:      Breasts:         Right: No inverted nipple, mass or nipple discharge.         Left: No inverted nipple, mass or nipple discharge.   Abdominal:      General: Abdomen is flat. There is no distension.      Palpations: Abdomen is soft.      Tenderness: There is no abdominal tenderness.   Musculoskeletal:         General: No deformity. Normal range of motion.      Cervical back: Normal range of motion and neck supple.   Lymphadenopathy:      Lower Body: No right inguinal adenopathy. No left inguinal adenopathy.   Neurological:      Mental Status: She is alert and oriented to person, place, " and time.   Skin:     General: Skin is warm and dry.      Findings: No erythema.   Psychiatric:         Behavior: Behavior normal.   Vitals reviewed. Exam conducted with a chaperone present.             Assessment     Diagnoses and all orders for this visit:    1. Encounter for gynecological examination without abnormal finding (Primary)    2. High risk for hip fracture  -     DEXA Bone Density Axial    3. Age-related osteoporosis without fracture  -     DEXA Bone Density Axial    Other orders  -     alendronate (FOSAMAX) 70 MG tablet; Take 1 tablet by mouth Every 7 (Seven) Days. SATURDAYS  Dispense: 12 tablet; Refill: 3    1) GYN exam   Expectations reviewed.   Some mild ESTELA, but not persistent, reviewed.   2) On meds for osteopenia with risk of fracture.   Repeat DEXA soon for stability      Plan     1) Breast Health - Clinical breast exam & mammogram reviewed specifically American Cancer Society recommendations for screening specific to her, and Self breast awareness monthly  2) Pap - up to date   3) Smoking status- non-smoker   4) Colon health - screening colonoscopy up to date  5) Bone health - Weight bearing exercise, dietary calcium recommendations and vitamin D reviewed.   On treatment, due for DEXA soon   6) Encouraged to be wary of information obtained via social media and internet based on source and search.   7) Follow up prn and one year      Jerel Lincoln MD   10/5/2021  14:35 EDT

## 2021-11-04 ENCOUNTER — TELEPHONE (OUTPATIENT)
Dept: OBSTETRICS AND GYNECOLOGY | Facility: CLINIC | Age: 71
End: 2021-11-04

## 2021-11-04 NOTE — TELEPHONE ENCOUNTER
----- Message from Jerel Lincoln MD sent at 10/5/2021  2:37 PM EDT -----  Julieth - will need to repeat DEXA in next several months/year. Thanks, Dr. Lincoln

## 2021-12-01 ENCOUNTER — PROCEDURE VISIT (OUTPATIENT)
Dept: OBSTETRICS AND GYNECOLOGY | Facility: CLINIC | Age: 71
End: 2021-12-01

## 2021-12-01 ENCOUNTER — APPOINTMENT (OUTPATIENT)
Dept: WOMENS IMAGING | Facility: HOSPITAL | Age: 71
End: 2021-12-01

## 2021-12-01 DIAGNOSIS — Z12.31 VISIT FOR SCREENING MAMMOGRAM: Primary | ICD-10-CM

## 2021-12-01 PROCEDURE — 77063 BREAST TOMOSYNTHESIS BI: CPT | Performed by: RADIOLOGY

## 2021-12-01 PROCEDURE — 77063 BREAST TOMOSYNTHESIS BI: CPT | Performed by: OBSTETRICS & GYNECOLOGY

## 2021-12-01 PROCEDURE — 77067 SCR MAMMO BI INCL CAD: CPT | Performed by: RADIOLOGY

## 2021-12-01 PROCEDURE — 77067 SCR MAMMO BI INCL CAD: CPT | Performed by: OBSTETRICS & GYNECOLOGY

## 2021-12-06 ENCOUNTER — TELEPHONE (OUTPATIENT)
Dept: OBSTETRICS AND GYNECOLOGY | Facility: CLINIC | Age: 71
End: 2021-12-06

## 2021-12-06 NOTE — TELEPHONE ENCOUNTER
Left message to call office about normal mammo.      Incorrect report has been removed from this pt's chart and placed in the correct chart.christiana

## 2021-12-06 NOTE — TELEPHONE ENCOUNTER
----- Message from Jerel Lincoln MD sent at 12/3/2021  9:13 AM EST -----  Malika, Normal mammogram. Should get letter from radiology. [facility] there are two reports attached. One is not the correct patient, or even my patient? Thanks, Dr. Lincoln

## 2022-01-05 ENCOUNTER — TRANSCRIBE ORDERS (OUTPATIENT)
Dept: ADMINISTRATIVE | Facility: HOSPITAL | Age: 72
End: 2022-01-05

## 2022-01-05 DIAGNOSIS — J84.9 ILD (INTERSTITIAL LUNG DISEASE): Primary | ICD-10-CM

## 2022-03-30 ENCOUNTER — TELEPHONE (OUTPATIENT)
Dept: OBSTETRICS AND GYNECOLOGY | Facility: CLINIC | Age: 72
End: 2022-03-30

## 2022-03-30 ENCOUNTER — HOSPITAL ENCOUNTER (OUTPATIENT)
Dept: BONE DENSITY | Facility: HOSPITAL | Age: 72
Discharge: HOME | End: 2022-03-30
Admitting: OBSTETRICS & GYNECOLOGY

## 2022-03-30 PROCEDURE — 77080 DXA BONE DENSITY AXIAL: CPT

## 2022-03-30 NOTE — TELEPHONE ENCOUNTER
----- Message from Jerel Lincoln MD sent at 3/30/2022  1:55 PM EDT -----  Linda, did DEXA today - We reviewed as she is on Fosamax for treatment. Her right hip 0.599 - was 0.561 in 2014, Left hip - 0.575, was 0.605 - 2019 and 0.581- 2014, and Spine 0.914 was 0.927 in 2014.  So overall she looks very stable in her density. So we discussed continue forward on same plan and treatment. She agrees. (I was initially concerned with the 2019, but that was only left hip and appears the 0.605 was high for her, so I do NOT think she is loosing significantly on her treatment). Thanks, Dr. Lincoln

## 2022-06-07 ENCOUNTER — HOSPITAL ENCOUNTER (OUTPATIENT)
Dept: CT IMAGING | Facility: HOSPITAL | Age: 72
Discharge: HOME | End: 2022-06-07
Admitting: INTERNAL MEDICINE

## 2022-06-07 DIAGNOSIS — J84.9 ILD (INTERSTITIAL LUNG DISEASE): ICD-10-CM

## 2022-06-07 PROCEDURE — 71250 CT THORAX DX C-: CPT

## 2022-12-02 ENCOUNTER — PROCEDURE VISIT (OUTPATIENT)
Dept: OBSTETRICS AND GYNECOLOGY | Facility: CLINIC | Age: 72
End: 2022-12-02

## 2022-12-02 ENCOUNTER — APPOINTMENT (OUTPATIENT)
Dept: WOMENS IMAGING | Facility: HOSPITAL | Age: 72
End: 2022-12-02

## 2022-12-02 DIAGNOSIS — Z12.31 VISIT FOR SCREENING MAMMOGRAM: Primary | ICD-10-CM

## 2022-12-02 PROCEDURE — 77067 SCR MAMMO BI INCL CAD: CPT | Performed by: OBSTETRICS & GYNECOLOGY

## 2022-12-02 PROCEDURE — 77067 SCR MAMMO BI INCL CAD: CPT | Performed by: RADIOLOGY

## 2022-12-02 PROCEDURE — 77063 BREAST TOMOSYNTHESIS BI: CPT | Performed by: OBSTETRICS & GYNECOLOGY

## 2022-12-02 PROCEDURE — 77063 BREAST TOMOSYNTHESIS BI: CPT | Performed by: RADIOLOGY

## 2023-02-15 ENCOUNTER — TELEPHONE (OUTPATIENT)
Dept: OBSTETRICS AND GYNECOLOGY | Facility: CLINIC | Age: 73
End: 2023-02-15
Payer: MEDICARE

## 2023-02-15 RX ORDER — ALENDRONATE SODIUM 70 MG/1
70 TABLET ORAL
Qty: 12 TABLET | Refills: 2 | Status: SHIPPED | OUTPATIENT
Start: 2023-02-15

## 2023-02-15 NOTE — TELEPHONE ENCOUNTER
Caller: April Feliz    Relationship: Self    Best call back number: [phone]    Requested Prescriptions:   Requested Prescriptions      No prescriptions requested or ordered in this encounter    alendronate (FOSAMAX) 70 MG tablet    Pharmacy where request should be sent:    [id number] - [facility] [address] [address] [facility] [address] - [phone]  - [phone]     Additional details provided by patient: PT IS COMPLETELY OUT AT THIS TIME     Does the patient have less than a 3 day supply:  [x] Yes  [] No    Would you like a call back once the refill request has been completed: [] Yes [x] No    If the office needs to give you a call back, can they leave a voicemail: [] Yes [x] No    Malinda Fernandez Rep   02/15/23 12:53 EST

## 2023-12-05 ENCOUNTER — PROCEDURE VISIT (OUTPATIENT)
Dept: OBSTETRICS AND GYNECOLOGY | Facility: CLINIC | Age: 73
End: 2023-12-05
Payer: MEDICARE

## 2023-12-05 ENCOUNTER — OFFICE VISIT (OUTPATIENT)
Dept: OBSTETRICS AND GYNECOLOGY | Facility: CLINIC | Age: 73
End: 2023-12-05
Payer: MEDICARE

## 2023-12-05 VITALS — BODY MASS INDEX: 22.9 KG/M2 | HEIGHT: 70 IN | WEIGHT: 160 LBS

## 2023-12-05 DIAGNOSIS — Z01.419 ENCOUNTER FOR GYNECOLOGICAL EXAMINATION WITHOUT ABNORMAL FINDING: Primary | ICD-10-CM

## 2023-12-05 DIAGNOSIS — M81.8 AGE-RELATED OSTEOPOROSIS WITHOUT FRACTURE: ICD-10-CM

## 2023-12-05 DIAGNOSIS — Z12.31 VISIT FOR SCREENING MAMMOGRAM: Primary | ICD-10-CM

## 2023-12-05 PROCEDURE — 77067 SCR MAMMO BI INCL CAD: CPT | Performed by: OBSTETRICS & GYNECOLOGY

## 2023-12-05 PROCEDURE — 77063 BREAST TOMOSYNTHESIS BI: CPT | Performed by: OBSTETRICS & GYNECOLOGY

## 2023-12-05 RX ORDER — VITAMIN E 268 MG
CAPSULE ORAL
COMMUNITY

## 2023-12-05 RX ORDER — ALENDRONATE SODIUM 70 MG/1
70 TABLET ORAL
Qty: 12 TABLET | Refills: 2 | Status: SHIPPED | OUTPATIENT
Start: 2023-12-05 | End: 2023-12-05 | Stop reason: SDUPTHER

## 2023-12-05 RX ORDER — ALENDRONATE SODIUM 70 MG/1
70 TABLET ORAL
Qty: 12 TABLET | Refills: 2 | Status: SHIPPED | OUTPATIENT
Start: 2023-12-05

## 2023-12-05 RX ORDER — FLUTICASONE PROPIONATE 50 MCG
1 SPRAY, SUSPENSION (ML) NASAL DAILY
COMMUNITY

## 2023-12-05 NOTE — PROGRESS NOTES
GYN Annual Exam     CC- Here for annual exam.     April Feliz is a 73 y.o. female who presents for annual well woman exam. Periods are  absent due to Menopause.   Pt c/o itching under her L underarm.     OB History          2    Para   2    Term   2            AB        Living   2         SAB        IAB        Ectopic        Molar        Multiple        Live Births                    Current contraception: post menopausal status  History of abnormal Pap smear: Yes, Cryo in the past   Family history of uterine, colon or ovarian cancer: yes - colon in grandmother   History of abnormal mammogram: no  Family history of breast cancer: yes - Mother and Aunt   Last Pap : 2019 NIL HPV neg  Last mammogram: today  Last colonoscopy: 3 yrs ago   Last DEXA: 2022 Osteoporosis  Parental Hip Fracture: No     Past Medical History:   Diagnosis Date    Abdominal hernia     Acid reflux     Asthma     Hyperlipidemia     Lymphocytic colitis     Osteoporosis     S/P wrist surgery     Seasonal allergies     Thyroid nodule        Past Surgical History:   Procedure Laterality Date    BRONCHOSCOPY N/A 2017    Procedure: BRONCHOSCOPY WITH BAL and brushings;  Surgeon: David Castellanos MD;  Location: [facility] ENDOSCOPY;  Service:     COLON RESECTION      COLONOSCOPY      GANGLION CYST EXCISION Right     NASAL FRACTURE CLOSED REDUCTION N/A 2019    Procedure: CLOSED REDUCTION NASAL FRACTURE;  Surgeon: Delgado Grijalva MD;  Location: [facility] OR;  Service: ENT    ORIF WRIST FRACTURE Left 2018    Procedure: ULNA/RADIUS OPEN REDUCTION INTERNAL FIXATION;  Surgeon: Bhanu Rosenthal MD;  Location: [facility] OR;  Service:     TUBAL ABDOMINAL LIGATION      VENTRAL HERNIA REPAIR N/A 2017    Procedure: VENTRAL HERNIA REPAIR LAPAROSCOPIC WITH DAVINCI ROBOT;  Surgeon: Nayeli Goldstein MD;  Location: [facility] OR;  Service:          Current Outpatient Medications:     alendronate  (FOSAMAX) 70 MG tablet, Take 1 tablet by mouth Every 7 (Seven) Days. SATURDAYS, Disp: 12 tablet, Rfl: 2    Acetylcysteine capsule capsule, Take  by mouth., Disp: , Rfl:     albuterol sulfate  (90 Base) MCG/ACT inhaler, Inhale 2 puffs As Needed., Disp: , Rfl:     atorvastatin (LIPITOR) 20 MG tablet, , Disp: , Rfl:     Calcium Citrate-Vitamin D (CALCIUM + D PO), Take 1 tablet by mouth Daily., Disp: , Rfl:     cetirizine (zyrTEC) 10 MG tablet, Take 10 mg by mouth Daily., Disp: , Rfl:     COLLAGEN PO, Take  by mouth., Disp: , Rfl:     fluticasone (FLONASE) 50 MCG/ACT nasal spray, 1 spray into the nostril(s) as directed by provider Daily., Disp: , Rfl:     fluticasone-salmeterol (ADVAIR) 100-50 MCG/DOSE DISKUS, Inhale 1 puff 2 (Two) Times a Day., Disp: , Rfl:     montelukast (SINGULAIR) 10 MG tablet, Take 10 mg by mouth Every Night., Disp: , Rfl:     Multiple Vitamins-Minerals (MULTIVITAMIN ADULT PO), Take 1 tablet by mouth Daily., Disp: , Rfl:     Vitamin E 180 MG (400 UNIT) capsule capsule, Take  by mouth., Disp: , Rfl:     Allergies   Allergen Reactions    Penicillins Rash       Social History     Tobacco Use    Smoking status: Former     Packs/day: 0.50     Years: 10.00     Additional pack years: 0.00     Total pack years: 5.00     Types: Cigarettes    Smokeless tobacco: Never    Tobacco comments:     33YRS AGO   Substance Use Topics    Alcohol use: Yes     Alcohol/week: 5.0 standard drinks of alcohol     Types: 5 Cans of beer per week    Drug use: No       Family History   Problem Relation Age of Onset    Breast cancer Mother     Colon cancer Maternal Grandmother     Breast cancer Maternal Aunt     Pancreatic cancer Father     Lymphoma Sister     Malig Hyperthermia Neg Hx     Ovarian cancer Neg Hx     Uterine cancer Neg Hx     Deep vein thrombosis Neg Hx     Pulmonary embolism Neg Hx        Review of Systems   Constitutional:  Negative for chills, fever and unexpected weight loss.   Gastrointestinal:   "Negative for abdominal pain.   Genitourinary:  Negative for dysuria, pelvic pain, vaginal bleeding and vaginal discharge.   All other systems reviewed and are negative.      Ht 177.8 cm (70\")   Wt 72.6 kg (160 lb)   BMI 22.96 kg/m²     Physical Exam  Constitutional:       General: She is not in acute distress.     Appearance: She is well-developed and normal weight.   Genitourinary:      Vulva, bladder, rectum and urethral meatus normal.      Right Labia: No lesions or Bartholin's cyst.     Left Labia: No lesions or Bartholin's cyst.     No inguinal adenopathy present in the right or left side.     No vaginal discharge or bleeding.      No vaginal prolapse present.     Mild vaginal atrophy present.       Right Adnexa: not tender, not full and no mass present.     Left Adnexa: not tender, not full and no mass present.     No cervical motion tenderness or friability.      No parametrium thickening present.     Uterus is not enlarged or tender.      No uterine mass detected.  Rectum:      No rectal mass or abnormal anal tone.   Breasts:     Right: No inverted nipple, mass or nipple discharge.      Left: No inverted nipple, mass or nipple discharge.   HENT:      Head: Normocephalic and atraumatic.   Eyes:      Conjunctiva/sclera: Conjunctivae normal.      Pupils: Pupils are equal, round, and reactive to light.   Neck:      Thyroid: No thyromegaly.   Cardiovascular:      Rate and Rhythm: Normal rate and regular rhythm.      Heart sounds: Normal heart sounds. No murmur heard.  Pulmonary:      Effort: Pulmonary effort is normal. No respiratory distress.      Breath sounds: Normal breath sounds.   Abdominal:      General: Abdomen is flat. There is no distension.      Palpations: Abdomen is soft.      Tenderness: There is no abdominal tenderness.   Musculoskeletal:         General: No deformity. Normal range of motion.      Cervical back: Normal range of motion and neck supple.   Lymphadenopathy:      Lower Body: No right " inguinal adenopathy. No left inguinal adenopathy.   Neurological:      Mental Status: She is alert and oriented to person, place, and time.   Skin:     General: Skin is warm and dry.      Findings: No erythema.   Psychiatric:         Behavior: Behavior normal.   Vitals reviewed. Exam conducted with a chaperone present.             Assessment     Diagnoses and all orders for this visit:    1. Encounter for gynecological examination without abnormal finding (Primary)  -     IGP, Rfx Aptima HPV ASCU    2. Age-related osteoporosis without fracture  -     DEXA Bone Density Axial    Other orders  -     Discontinue: alendronate (FOSAMAX) 70 MG tablet; Take 1 tablet by mouth Every 7 (Seven) Days. SATURDAYS  Dispense: 12 tablet; Refill: 2  -     alendronate (FOSAMAX) 70 MG tablet; Take 1 tablet by mouth Every 7 (Seven) Days. SATURDAYS  Dispense: 12 tablet; Refill: 2         Plan     1) Breast Health - Clinical breast exam & mammogram reviewed specifically American Cancer Society recommendations for screening specific to her, and Self breast awareness monthly  CBE normal, MMG updated   2) Pap - updated today   3) Smoking status- non-smoker   4) Colon health - screening colonoscopy up to date  5) Bone health - Weight bearing exercise, dietary calcium recommendations and vitamin D reviewed.   On fosamax for mild osteopenia  Repeating at 2 years, consider hiatus off or change to longer range treatment   6) Encouraged between 7-8 hours of good sleep per night.   7) Follow up prn and one year      Jerel Lincoln MD   12/5/2023  15:40 EST

## 2023-12-07 LAB
CONV .: NORMAL
CYTOLOGIST CVX/VAG CYTO: NORMAL
CYTOLOGY CVX/VAG DOC CYTO: NORMAL
CYTOLOGY CVX/VAG DOC THIN PREP: NORMAL
DX ICD CODE: NORMAL
HIV 1 & 2 AB SER-IMP: NORMAL
OTHER STN SPEC: NORMAL
STAT OF ADQ CVX/VAG CYTO-IMP: NORMAL

## 2024-05-16 ENCOUNTER — TRANSCRIBE ORDERS (OUTPATIENT)
Dept: ADMINISTRATIVE | Facility: HOSPITAL | Age: 74
End: 2024-05-16
Payer: MEDICARE

## 2024-05-16 DIAGNOSIS — J84.9 ILD (INTERSTITIAL LUNG DISEASE): Primary | ICD-10-CM

## 2024-05-28 ENCOUNTER — HOSPITAL ENCOUNTER (OUTPATIENT)
Dept: BONE DENSITY | Facility: HOSPITAL | Age: 74
Discharge: HOME | End: 2024-05-28
Admitting: OBSTETRICS & GYNECOLOGY
Payer: MEDICARE

## 2024-05-28 PROCEDURE — 77080 DXA BONE DENSITY AXIAL: CPT

## 2024-05-30 DIAGNOSIS — M81.8 AGE-RELATED OSTEOPOROSIS WITHOUT FRACTURE: Primary | ICD-10-CM

## 2024-06-13 ENCOUNTER — TELEPHONE (OUTPATIENT)
Dept: OBSTETRICS AND GYNECOLOGY | Facility: CLINIC | Age: 74
End: 2024-06-13
Payer: MEDICARE

## 2024-06-13 NOTE — TELEPHONE ENCOUNTER
----- Message from Linda WHARTON sent at 6/13/2024 11:53 AM EDT -----  Again, issue with phone. Mychart message sent.    Linda  ----- Message -----  From: Linda Portillo MA  Sent: 6/3/2024   5:16 PM EDT  To: Linda Portillo MA    Unable to reach pt, no vm. Phone answers and hangs up.     Linda  ----- Message -----  From: Linda Portillo MA  Sent: 5/31/2024  11:35 AM EDT  To: Linda Portillo MA    Tried calling pt, unable to contact her x 2/greg  ----- Message -----  From: Jerel Lincoln MD  Sent: 5/30/2024   8:59 AM EDT  To: CHRISSY Gonzalez, Ordered vitamin D level as I don't see a recent one. Her DEXA is generally slightly better to unchanged on the DEXA done 3/30/22.  So for now would either continue current treatment or consider further treatment with another agent. If tolerating fosamax, I would stick with that for now. Please let her know. Thanks, Dr. Lincoln

## 2024-06-26 ENCOUNTER — TELEPHONE (OUTPATIENT)
Dept: OBSTETRICS AND GYNECOLOGY | Facility: CLINIC | Age: 74
End: 2024-06-26
Payer: MEDICARE

## 2024-08-17 ENCOUNTER — HOSPITAL ENCOUNTER (EMERGENCY)
Facility: HOSPITAL | Age: 74
Discharge: HOME | End: 2024-08-17
Attending: STUDENT IN AN ORGANIZED HEALTH CARE EDUCATION/TRAINING PROGRAM
Payer: MEDICARE

## 2024-08-17 VITALS
SYSTOLIC BLOOD PRESSURE: 142 MMHG | OXYGEN SATURATION: 98 % | RESPIRATION RATE: 17 BRPM | TEMPERATURE: 98 F | DIASTOLIC BLOOD PRESSURE: 100 MMHG | HEART RATE: 85 BPM

## 2024-08-17 DIAGNOSIS — S91.112A LACERATION OF LEFT GREAT TOE WITHOUT FOREIGN BODY PRESENT OR DAMAGE TO NAIL, INITIAL ENCOUNTER: Primary | ICD-10-CM

## 2024-08-17 PROCEDURE — 99282 EMERGENCY DEPT VISIT SF MDM: CPT

## 2024-08-17 NOTE — ED NOTES
PT to ER via [facility] from home for a cut on her big toe on the L foot. Pt said her foot got snagged and hit her foot on the metal desk

## 2024-08-17 NOTE — ED PROVIDER NOTES
MD ATTESTATION NOTE    The RILEY and I have discussed this patient's history, physical exam, and treatment plan.  I have reviewed the documentation and personally had a face to face interaction with the patient. I affirm the documentation and agree with the treatment and plan.  The attached note describes my personal findings.      I provided a substantive portion of the care of the patient.  I personally performed the physical exam in its entirety, and below are my findings.        Brief HPI: Patient presented to the emergency department with left great toe injury.  Cut it and then noticed it was bleeding.  No pain.  Ambulatory without difficulty.    PHYSICAL EXAM  ED Triage Vitals   Temp Heart Rate Resp BP SpO2   08/17/24 1341 08/17/24 1341 08/17/24 1341 08/17/24 1401 08/17/24 1341   98 °F (36.7 °C) 118 17 147/99 98 %      Temp src Heart Rate Source Patient Position BP Location FiO2 (%)   -- 08/17/24 1402 08/17/24 1402 -- --    Monitor Lying           GENERAL: no acute distress  HENT: nares patent  EYES: no scleral icterus  CV: regular rhythm, normal rate  RESPIRATORY: normal effort  ABDOMEN: soft  MUSCULOSKELETAL: no deformity, small laceration over the dorsum of the left great toe with associated contusion, no deformity, also contusion to the left knee.  NEURO: alert, moves all extremities, follows commands  PSYCH:  calm, cooperative  SKIN: warm, dry    Vital signs and nursing notes reviewed.        Plan: Patient presented emergency department status post left lower extremity injury.  Otherwise well-appearing, vitals otherwise stable.  Laceration repaired primarily.  Offered x-ray however patient declined.  Given return precautions with the discharged home.    ED Course as of 08/18/24 1147   Sat Aug 17, 2024   1525 Patient presents to emergency department with great toe laceration.  Patient has no tenderness and declines x-ray at this time.  She is up-to-date on tetanus.  Laceration was repaired as described  above.  Encouraged her to follow-up with PCP for suture removal.  Discussed ED return precautions.  She is otherwise well-appearing, hemodynamically stable, and therefore appropriate for discharge. [MP]      ED Course User Index  [MP] Oxana Gaitan PA-C       SHARED VISIT: This visit was performed by BOTH a physician and an APC. The substantive portion of the medical decision making was performed by this attesting physician who made or approved the management plan and takes responsibility for patient management. All studies in the APC note (if performed) were independently interpreted by me.        Holden Martinez MD  08/17/24 6330       Holden Martinez MD  08/18/24 [id number]

## 2024-08-17 NOTE — DISCHARGE INSTRUCTIONS
Follow-up with primary care provider.  Have stitches removed in 7 to 10 days.  This may be done at your primary care office, urgent care, or any ER.  You may shower and wash your foot with running water.  Do not submerge in bathtub, pool, lake, or any other standing water.  Return to emergency department for any worsening symptoms.

## 2024-08-17 NOTE — ED PROVIDER NOTES
EMERGENCY DEPARTMENT ENCOUNTER  Room Number:  04/04  PCP: Petra Webber APRN  Independent Historians: Patient      HPI:  Chief Complaint: had concerns including Toe Injury.     A complete HPI/ROS/PMH/PSH/SH/FH are unobtainable due to: None    Chronic or social conditions impacting patient care (Social Determinants of Health): None      Context: April Feliz is a 74 y.o. female with a medical history of GERD, asthma, hyperlipidemia, osteoporosis who presents to the ED c/o acute left great wound.  Patient reports she was getting ready this morning.  Her right foot got caught in her pants and caused her to fall forward.  She hit her left knee and her left great toe on the metal edge of a desk.  She put a Band-Aid on her [place]  While at the [facility] she noticed her she was full of blood and went to the first-aid tent.  She was told she needed to come to the emergency department for stitches.  Patient is not anticoagulated.  Last Tdap November 2019.  Patient has no other systemic complaints at this time.      Review of prior external notes (non-ED) -and- Review of prior external test results outside of this encounter:  Patient seen in office by OB/GYN on 12/5/2024 for annual gynecologic exam and osteoporosis.  Reviewed assessment and plan.  Will order vitamin D level and have patient follow-up as scheduled.  Reviewed labs collected on 9/5/2023.  Hemoglobin A1c 5.5, CMP with creatinine 0.71.    Prescription drug monitoring program review:     N/A    PAST MEDICAL HISTORY  Active Ambulatory Problems     Diagnosis Date Noted    Closed fracture of left distal radius 01/16/2018     Resolved Ambulatory Problems     Diagnosis Date Noted    No Resolved Ambulatory Problems     Past Medical History:   Diagnosis Date    Abdominal hernia     Acid reflux     Asthma     Hyperlipidemia     Lymphocytic colitis     Osteoporosis     S/P wrist surgery     Seasonal allergies     Thyroid nodule           PAST SURGICAL HISTORY  Past Surgical History:   Procedure Laterality Date    BRONCHOSCOPY N/A 11/2/2017    Procedure: BRONCHOSCOPY WITH BAL and brushings;  Surgeon: David Castellanos MD;  Location: [facility] ENDOSCOPY;  Service:     COLON RESECTION      COLONOSCOPY      GANGLION CYST EXCISION Right     NASAL FRACTURE CLOSED REDUCTION N/A 11/12/2019    Procedure: CLOSED REDUCTION NASAL FRACTURE;  Surgeon: Delgado Grijalva MD;  Location: [facility] MAIN OR;  Service: ENT    ORIF WRIST FRACTURE Left 1/19/2018    Procedure: ULNA/RADIUS OPEN REDUCTION INTERNAL FIXATION;  Surgeon: Bhanu Rosenthal MD;  Location: [facility] OR;  Service:     TUBAL ABDOMINAL LIGATION      VENTRAL HERNIA REPAIR N/A 6/20/2017    Procedure: VENTRAL HERNIA REPAIR LAPAROSCOPIC WITH DAVINCI ROBOT;  Surgeon: Nayeli Goldstein MD;  Location: [facility] OR;  Service:          FAMILY HISTORY  Family History   Problem Relation Age of Onset    Breast cancer Mother     Colon cancer Maternal Grandmother     Breast cancer Maternal Aunt     Pancreatic cancer Father     Lymphoma Sister     Malig Hyperthermia Neg Hx     Ovarian cancer Neg Hx     Uterine cancer Neg Hx     Deep vein thrombosis Neg Hx     Pulmonary embolism Neg Hx          SOCIAL HISTORY  Social History     Socioeconomic History    Marital status:    Tobacco Use    Smoking status: Former     Current packs/day: 0.50     Average packs/day: 0.5 packs/day for 10.0 years (5.0 ttl pk-yrs)     Types: Cigarettes    Smokeless tobacco: Never    Tobacco comments:     33YRS AGO   Substance and Sexual Activity    Alcohol use: Yes     Alcohol/week: 5.0 standard drinks of alcohol     Types: 5 Cans of beer per week    Drug use: No    Sexual activity: Yes     Partners: Male         ALLERGIES  Penicillins      REVIEW OF SYSTEMS  Review of Systems   Constitutional:  Negative for chills and fever.   HENT:  Negative for ear pain and sore throat.    Respiratory:  Negative for cough and  shortness of breath.    Cardiovascular:  Negative for chest pain and palpitations.   Gastrointestinal:  Negative for abdominal pain and vomiting.   Genitourinary:  Negative for dysuria and hematuria.   Musculoskeletal:  Negative for arthralgias and joint swelling.   Skin:  Positive for wound. Negative for pallor and rash.   Neurological:  Negative for numbness and headaches.   Psychiatric/Behavioral:  Negative for confusion and hallucinations.      Included in HPI  All systems reviewed and negative except for those discussed in HPI.      PHYSICAL EXAM    I have reviewed the triage vital signs and nursing notes.    ED Triage Vitals [08/17/24 1341]   Temp Heart Rate Resp BP SpO2   98 °F (36.7 °C) 118 17 -- 98 %      Temp src Heart Rate Source Patient Position BP Location FiO2 (%)   -- -- -- -- --       Physical Exam  Constitutional:       General: She is not in acute distress.     Appearance: She is well-developed.   HENT:      Head: Normocephalic and atraumatic.   Eyes:      Extraocular Movements: Extraocular movements intact.   Cardiovascular:      Rate and Rhythm: Normal rate and regular rhythm.      Heart sounds: Normal heart sounds.   Pulmonary:      Effort: Pulmonary effort is normal.      Breath sounds: Normal breath sounds.   Abdominal:      General: There is no distension.   Musculoskeletal:        Feet:    Feet:      Comments: Approximately 2 cm linear laceration over the IP joint on the dorsal aspect of the left great toe  Skin:     General: Skin is warm.   Neurological:      General: No focal deficit present.      Mental Status: She is alert and oriented to person, place, and time.   Psychiatric:         Mood and Affect: Mood normal.           ORDERS PLACED DURING THIS VISIT:  Orders Placed This Encounter   Procedures    Laceration Repair         OUTPATIENT MEDICATION MANAGEMENT:  No current Epic-ordered facility-administered medications on file.     Current Outpatient Medications Ordered in Epic    Medication Sig Dispense Refill    Acetylcysteine capsule capsule Take  by mouth.      albuterol sulfate  (90 Base) MCG/ACT inhaler Inhale 2 puffs As Needed.      alendronate (FOSAMAX) 70 MG tablet Take 1 tablet by mouth Every 7 (Seven) Days. SATURDAYS 12 tablet 2    atorvastatin (LIPITOR) 20 MG tablet       Calcium Citrate-Vitamin D (CALCIUM + D PO) Take 1 tablet by mouth Daily.      cetirizine (zyrTEC) 10 MG tablet Take 10 mg by mouth Daily.      COLLAGEN PO Take  by mouth.      fluticasone (FLONASE) 50 MCG/ACT nasal spray 1 spray into the nostril(s) as directed by provider Daily.      fluticasone-salmeterol (ADVAIR) 100-50 MCG/DOSE DISKUS Inhale 1 puff 2 (Two) Times a Day.      montelukast (SINGULAIR) 10 MG tablet Take 10 mg by mouth Every Night.      Multiple Vitamins-Minerals (MULTIVITAMIN ADULT PO) Take 1 tablet by mouth Daily.      Vitamin E 180 MG (400 UNIT) capsule capsule Take  by mouth.           PROCEDURES  Laceration Repair    Date/Time: 8/17/2024 3:16 PM    Performed by: Oxana Gaitan PA-C  Authorized by: Holden Martinez MD    Consent:     Consent obtained:  Verbal    Consent given by:  Patient    Risks discussed:  Poor cosmetic result and pain    Alternatives discussed:  No treatment  Universal protocol:     Procedure explained and questions answered to patient or proxy's satisfaction: yes      Patient identity confirmed:  Verbally with patient  Anesthesia:     Anesthesia method:  Local infiltration    Local anesthetic:  Lidocaine 1% WITH epi  Laceration details:     Location:  Toe    Toe location:  L big toe    Length (cm):  2  Pre-procedure details:     Preparation:  Patient was prepped and draped in usual sterile fashion  Exploration:     Hemostasis achieved with:  Epinephrine and direct pressure    Imaging outcome: foreign body not noted      Wound exploration: wound explored through full range of motion and entire depth of wound visualized      Wound extent: no foreign  bodies/material noted      Contaminated: no    Treatment:     Area cleansed with:  Saline    Amount of cleaning:  Standard    Irrigation solution:  Sterile saline    Irrigation volume:  20 ml    Irrigation method:  Syringe    Visualized foreign bodies/material removed: no      Debridement:  None    Undermining:  None    Scar revision: no    Skin repair:     Repair method:  Sutures    Suture size:  5-0    Suture material:  Nylon    Suture technique:  Simple interrupted    Number of sutures:  3  Approximation:     Approximation:  Close  Repair type:     Repair type:  Simple  Post-procedure details:     Dressing:  Non-adherent dressing    Procedure completion:  Tolerated well, no immediate complications        PROGRESS, DATA ANALYSIS, CONSULTS, AND MEDICAL DECISION MAKING  All labs have been independently interpreted by me.  All radiology studies have been reviewed by me. All EKG's have been independently viewed and interpreted by me.  Discussion below represents my analysis of pertinent findings related to patient's condition, differential diagnosis, treatment plan and final disposition.    Differential diagnosis includes but is not limited to toe laceration, tendon laceration, toe fracture.        ED Course as of 08/17/24 1526   Sat Aug 17, 2024   1525 Patient presents to emergency department with great toe laceration.  Patient has no tenderness and declines x-ray at this time.  She is up-to-date on tetanus.  Laceration was repaired as described above.  Encouraged her to follow-up with PCP for suture removal.  Discussed ED return precautions.  She is otherwise well-appearing, hemodynamically stable, and therefore appropriate for discharge. [MP]      ED Course User Index  [MP] Oxana Gaitan PA-C             AS OF 15:26 EDT VITALS:    BP - 142/100  HR - 85  TEMP - 98 °F (36.7 °C)  O2 SATS - 98%    COMPLEXITY OF CARE  Admission was considered but after careful review of the patient's presentation, physical  examination, diagnostic results, and response to treatment the patient may be safely discharged with outpatient follow-up.      DIAGNOSIS  Final diagnoses:   Laceration of left great toe without foreign body present or damage to nail, initial encounter         DISPOSITION  ED Disposition       ED Disposition   Discharge    Condition   Stable    Comment   --                Please note that portions of this document were completed with a voice recognition program.    Note Disclaimer: At [facility], we believe that sharing information builds trust and better relationships. You are receiving this note because you recently visited [facility]. It is possible you will see health information before a provider has talked with you about it. This kind of information can be easy to misunderstand. To help you fully understand what it means for your health, we urge you to discuss this note with your provider.         Oxana Gaitan PA-C  08/17/24 1525

## 2024-12-31 ENCOUNTER — PROCEDURE VISIT (OUTPATIENT)
Dept: OBSTETRICS AND GYNECOLOGY | Facility: CLINIC | Age: 74
End: 2024-12-31
Payer: MEDICARE

## 2024-12-31 DIAGNOSIS — Z12.31 VISIT FOR SCREENING MAMMOGRAM: Primary | ICD-10-CM

## 2024-12-31 PROCEDURE — 77063 BREAST TOMOSYNTHESIS BI: CPT | Performed by: OBSTETRICS & GYNECOLOGY

## 2024-12-31 PROCEDURE — 77067 SCR MAMMO BI INCL CAD: CPT | Performed by: OBSTETRICS & GYNECOLOGY

## 2025-01-09 RX ORDER — ALENDRONATE SODIUM 70 MG/1
TABLET ORAL
Qty: 12 TABLET | Refills: 2 | Status: SHIPPED | OUTPATIENT
Start: 2025-01-09

## 2025-05-05 ENCOUNTER — HOSPITAL ENCOUNTER (OUTPATIENT)
Dept: CT IMAGING | Facility: HOSPITAL | Age: 75
Discharge: HOME | End: 2025-05-05
Admitting: INTERNAL MEDICINE
Payer: MEDICARE

## 2025-05-05 DIAGNOSIS — J84.9 ILD (INTERSTITIAL LUNG DISEASE): ICD-10-CM

## 2025-05-05 PROCEDURE — 71250 CT THORAX DX C-: CPT

## 2025-05-17 ENCOUNTER — TRANSCRIBE ORDERS (OUTPATIENT)
Dept: ADMINISTRATIVE | Facility: HOSPITAL | Age: 75
End: 2025-05-17
Payer: MEDICARE

## 2025-05-17 DIAGNOSIS — J84.9 ILD (INTERSTITIAL LUNG DISEASE): Primary | ICD-10-CM

## 2025-05-23 ENCOUNTER — TRANSCRIBE ORDERS (OUTPATIENT)
Dept: ADMINISTRATIVE | Facility: HOSPITAL | Age: 75
End: 2025-05-23
Payer: MEDICARE

## 2025-05-23 DIAGNOSIS — J84.9 ILD (INTERSTITIAL LUNG DISEASE): Primary | ICD-10-CM

## 2025-05-29 ENCOUNTER — HOSPITAL ENCOUNTER (OUTPATIENT)
Dept: GENERAL RADIOLOGY | Facility: HOSPITAL | Age: 75
Discharge: HOME | End: 2025-05-29
Admitting: INTERNAL MEDICINE
Payer: MEDICARE

## 2025-05-29 DIAGNOSIS — J84.9 ILD (INTERSTITIAL LUNG DISEASE): ICD-10-CM

## 2025-05-29 PROCEDURE — 63710000001 BARIUM SULFATE 96 % RECONSTITUTED SUSPENSION: Performed by: INTERNAL MEDICINE

## 2025-05-29 PROCEDURE — A9270 NON-COVERED ITEM OR SERVICE: HCPCS | Performed by: INTERNAL MEDICINE

## 2025-05-29 PROCEDURE — 63710000001 SOD BICARB-CITRIC ACID-SIMETHICONE 2.21-1.53-0.04 G PACK: Performed by: INTERNAL MEDICINE

## 2025-05-29 PROCEDURE — 74220 X-RAY XM ESOPHAGUS 1CNTRST: CPT

## 2025-05-29 PROCEDURE — 63710000001 BARIUM SULFATE 98 % RECONSTITUTED SUSPENSION: Performed by: INTERNAL MEDICINE

## 2025-05-29 RX ADMIN — ANTACID/ANTIFLATULENT 1 PACKET: 380; 550; 10; 10 GRANULE, EFFERVESCENT ORAL at 08:12

## 2025-05-29 RX ADMIN — BARIUM SULFATE 183 ML: 960 POWDER, FOR SUSPENSION ORAL at 08:12

## 2025-05-29 RX ADMIN — BARIUM SULFATE 135 ML: 980 POWDER, FOR SUSPENSION ORAL at 08:12

## (undated) DEVICE — OCCLUSIVE GAUZE STRIP,3% BISMUTH TRIBROMOPHENATE IN PETROLATUM BLEND: Brand: XEROFORM

## (undated) DEVICE — VITAL SIGNS™ JACKSON-REES CIRCUITS: Brand: VITAL SIGNS™

## (undated) DEVICE — TIP COVER ACCESSORY

## (undated) DEVICE — SOL NACL 0.9PCT 1000ML

## (undated) DEVICE — STANDARD HYPODERMIC NEEDLE,POLYPROPYLENE HUB: Brand: MONOJECT

## (undated) DEVICE — MARKR SKIN W/RULR AND LBL

## (undated) DEVICE — ANTIBACTERIAL UNDYED BRAIDED (POLYGLACTIN 910), SYNTHETIC ABSORBABLE SUTURE: Brand: COATED VICRYL

## (undated) DEVICE — Device

## (undated) DEVICE — TUBING, SUCTION, 1/4" X 10', STRAIGHT: Brand: MEDLINE

## (undated) DEVICE — ENCORE® LATEX ORTHO SIZE 6.5, STERILE LATEX POWDER-FREE SURGICAL GLOVE: Brand: ENCORE

## (undated) DEVICE — SUT ETHLN 3/0 PS1 18IN 1663H

## (undated) DEVICE — SINGLE USE SUCTION VALVE MAJ-209: Brand: SINGLE USE SUCTION VALVE (STERILE)

## (undated) DEVICE — TBG 02 CRUSH RESIST LF CLR 7FT

## (undated) DEVICE — APPL COTN TP PLSTC 6IN STRL LF PK/2

## (undated) DEVICE — UNDYED BRAIDED (POLYGLACTIN 910), SYNTHETIC ABSORBABLE SUTURE: Brand: COATED VICRYL

## (undated) DEVICE — BITEBLOCK OMNI BLOC

## (undated) DEVICE — TRAP,MUCUS SPECIMEN, 80CC: Brand: MEDLINE

## (undated) DEVICE — 1842 FOAM BLOCK NEEDLE COUNTER: Brand: DEVON

## (undated) DEVICE — 3M™ IOBAN™ 2 ANTIMICROBIAL INCISE DRAPE 6650EZ: Brand: IOBAN™ 2

## (undated) DEVICE — SUT VIC 3/0 PS2 27IN J427H

## (undated) DEVICE — OBT BLADLES ENDOWRIST DAVINCI/S 8MM

## (undated) DEVICE — SPLINT 1528110 25PK EXTERNAL NASAL SMALL

## (undated) DEVICE — TOTAL TRAY, 16FR 10ML SIL FOLEY, URN: Brand: MEDLINE

## (undated) DEVICE — SUT SILK 3/0 SH 30IN K832H

## (undated) DEVICE — GLV SURG PREMIERPRO ORTHO LTX PF SZ7 BRN

## (undated) DEVICE — BNDG ELAS ELITE V/CLOSE 4IN 5YD LF STRL

## (undated) DEVICE — STERILE COTTON TIP 6IN 10PK: Brand: MEDLINE

## (undated) DEVICE — ADAPT SWVL FIBROPTIC BRONCH

## (undated) DEVICE — PACKING 440413 10PK DOYLE SLIMLINE: Brand: MEROCEL®

## (undated) DEVICE — GOWN,NON-REINFORCED,SIRUS,SET IN SLV,XL: Brand: MEDLINE

## (undated) DEVICE — APPL CHLORAPREP W/TINT 26ML ORNG

## (undated) DEVICE — DEV SUT GRSPR CLOSUR 15CM 14G

## (undated) DEVICE — CONMED DISPOSABLE BRONCHIAL CYTOLOGY BRUSH, STRAIGHT HANDLE, 3 MM X 120 CM: Brand: CONMED

## (undated) DEVICE — 3M™ STERI-STRIP™ REINFORCED ADHESIVE SKIN CLOSURES, R1547, 1/2 IN X 4 IN (12 MM X 100 MM), 6 STRIPS/ENVELOPE: Brand: 3M™ STERI-STRIP™

## (undated) DEVICE — SOL ISO/ALC RUB 70PCT 4OZ

## (undated) DEVICE — LOU LAP CHOLE: Brand: MEDLINE INDUSTRIES, INC.

## (undated) DEVICE — NDL HYPO PRECISIONGLIDE REG 25G 1 1/2

## (undated) DEVICE — GOWN ,SIRUS,NONREINFORCED SMALL: Brand: MEDLINE

## (undated) DEVICE — UNDERCAST PADDING: Brand: DEROYAL

## (undated) DEVICE — GLV SURG BIOGEL LTX PF 8

## (undated) DEVICE — GLV SURG TRIUMPH CLASSIC PF LTX 8 STRL

## (undated) DEVICE — BIT DRL LNG 1.7MM

## (undated) DEVICE — DISPOSABLE TOURNIQUET CUFF SINGLE BLADDER, SINGLE PORT AND QUICK CONNECT CONNECTOR: Brand: COLOR CUFF

## (undated) DEVICE — BNDR ABD PREMIUM/UNIV 10IN 27TO48IN

## (undated) DEVICE — LAPAROSCOPIC SMOKE ELIMINATION DEVICE: Brand: PNEUVIEW XE

## (undated) DEVICE — DRAPE,U/ SHT,SPLIT,PLAS,STERIL: Brand: MEDLINE

## (undated) DEVICE — SINGLE USE BIOPSY VALVE MAJ-210: Brand: SINGLE USE BIOPSY VALVE (STERILE)

## (undated) DEVICE — DRP C/ARM 41X74IN

## (undated) DEVICE — PK ORTHO MINOR 40

## (undated) DEVICE — SKIN PREP TRAY W/CHG: Brand: MEDLINE INDUSTRIES, INC.

## (undated) DEVICE — MSK AIRWY LARYNG LMA UNIQUE STD PK SZ4

## (undated) DEVICE — VISUALIZATION SYSTEM: Brand: CLEARIFY

## (undated) DEVICE — SPNG GZ WOVN 4X4IN 12PLY 10/BX STRL

## (undated) DEVICE — 3M™ STERI-STRIP™ COMPOUND BENZOIN TINCTURE 40 BAGS/CARTON 4 CARTONS/CASE C1544: Brand: 3M™ STERI-STRIP™

## (undated) DEVICE — SYR CONTRL LUERLOK 10CC

## (undated) DEVICE — SOL ANTISTICK CAUTRY ELECTROLUBE LF

## (undated) DEVICE — DRAPE,REIN 53X77,STERILE: Brand: MEDLINE

## (undated) DEVICE — GOWN,PREVENTION PLUS,XLONG/XLARGE,STRL: Brand: MEDLINE

## (undated) DEVICE — SUT MNCRYL 3/0 PS2 18IN MCP497G

## (undated) DEVICE — LN SMPL O2 NASL/ORL SMART/CAPNOLINE PLS A/

## (undated) DEVICE — PK ENT MAJ 40

## (undated) DEVICE — ENDOPATH XCEL BLADELESS TROCARS WITH STABILITY SLEEVES: Brand: ENDOPATH XCEL

## (undated) DEVICE — ENDOPATH PNEUMONEEDLE INSUFFLATION NEEDLES WITH LUER LOCK CONNECTORS 120MM: Brand: ENDOPATH

## (undated) DEVICE — TOWEL,OR,DSP,ST,BLUE,STD,4/PK,20PK/CS: Brand: MEDLINE

## (undated) DEVICE — ENCORE® LATEX ORTHO SIZE 8, STERILE LATEX POWDER-FREE SURGICAL GLOVE: Brand: ENCORE